# Patient Record
Sex: FEMALE | Employment: UNEMPLOYED | URBAN - METROPOLITAN AREA
[De-identification: names, ages, dates, MRNs, and addresses within clinical notes are randomized per-mention and may not be internally consistent; named-entity substitution may affect disease eponyms.]

---

## 2018-09-17 ENCOUNTER — TRANSFERRED RECORDS (OUTPATIENT)
Dept: HEALTH INFORMATION MANAGEMENT | Facility: CLINIC | Age: 38
End: 2018-09-17

## 2018-10-24 ENCOUNTER — OFFICE VISIT (OUTPATIENT)
Dept: OTOLARYNGOLOGY | Facility: CLINIC | Age: 38
End: 2018-10-24

## 2018-10-24 DIAGNOSIS — Z98.890 POSTOPERATIVE STATE: Primary | ICD-10-CM

## 2018-10-24 NOTE — LETTER
10/24/2018     RE: Michelle Hendrickson  [address] [place] [address]     Dear Colleague,    Thank you for referring your patient, Michelle Hendrickson, to the [facility]NT [facility] at [facility]. Please see a copy of my visit note below.    Service Date: 10/24/2018      Ms. Hendrickson is back today and greatly appreciates the improvement in the appearance of her nose.  She breathes better as well.  She has noticed some tip fullness, which was expected and discussed with her preoperatively as we expected.  I think things can be improved with some steroid injection; 0.15 mL of a mix of 10 and 40 mg/mL Kenalog was placed in the right supraalar area on the right and in the supertip area.  She will see us in about 8 weeks.         TYRA NORMAN MD       D: 10/24/2018   T: 10/25/2018   MT: eugene    Name:     MICHELLE HENDRICKSON   MRN:      [id number]        Account:      [id number]   :      1980           Service Date: 10/24/2018    Document: [id number]

## 2018-10-25 NOTE — PROGRESS NOTES
Service Date: 10/24/2018      Ms. Hendrickson is back today and greatly appreciates the improvement in the appearance of her nose.  She breathes better as well.  She has noticed some tip fullness, which was expected and discussed with her preoperatively as we expected.  I think things can be improved with some steroid injection; 0.15 mL of a mix of 10 and 40 mg/mL Kenalog was placed in the right supraalar area on the right and in the supertip area.  She will see us in about 8 weeks.         TYRA NORMAN MD             D: 10/24/2018   T: 10/25/2018   MT: eugene      Name:     MICHELLE HENDRICKSON   MRN:      [id number]        Account:      [id number]   :      1980           Service Date: 10/24/2018      Document: [id number]

## 2018-10-29 ENCOUNTER — OFFICE VISIT (OUTPATIENT)
Dept: OBGYN | Facility: CLINIC | Age: 38
End: 2018-10-29
Payer: MEDICAID

## 2018-10-29 DIAGNOSIS — N89.8 VAGINAL DISCHARGE: ICD-10-CM

## 2018-10-29 DIAGNOSIS — N89.8 VAGINAL ITCHING: Primary | ICD-10-CM

## 2018-10-29 DIAGNOSIS — N92.0 MENORRHAGIA WITH REGULAR CYCLE: ICD-10-CM

## 2018-10-29 LAB
SPECIMEN SOURCE: NORMAL
WET PREP SPEC: NORMAL

## 2018-10-29 PROCEDURE — 87210 SMEAR WET MOUNT SALINE/INK: CPT | Performed by: OBSTETRICS & GYNECOLOGY

## 2018-10-29 PROCEDURE — 99203 OFFICE O/P NEW LOW 30 MIN: CPT | Performed by: OBSTETRICS & GYNECOLOGY

## 2018-10-29 NOTE — PROGRESS NOTES
Maria Esther is a 38 year old  referred here by self for consultation regarding vaginal itching for 3 days. She tried OTC monistat that caused a lot of burning so she cleaned it off after 2 hours. She used the monistat because it was recommended 2 years ago with similler symptoms. She also did some shaving about a day prior to the itching..  She had hysteroscopy/polypectomy in 3/2018. Has PARAGARD IUD Placed. She is inquiring about possibly switching to Mirena IUD to better control her menses.  She is new to [place] and plans on transferring her care.  Obstetric History    T3  L3   SAB1 TAB0 Ectopic0 Multiple0 Live Births3     # Outcome Date GA Lbr Josh/2nd Weight Sex Delivery Anes PTL Lv   4 Term 16 40w3d 3.63 kg (8 lb) M Vag EPIDURAL BRENDA   Apgar1: 9 Apgar5: 9   3 Term 2012 41w0d VAGINAL RYAN BRENDA   2 SAB 2011   1 Term 2010 41w0d VAGINAL RYAN BRENDA     ROS: Ten point review of systems was reviewed and negative except the above.    Gyne: - abn pap (last pap ), - STD's  Past Surgical History:   Procedure Laterality Date     APPENDECTOMY  2014     AS HYSTEROSCOPY W ENDOMETRIAL BX/POLYPECTOMY W/WO D&C N/A 2018       ALL/Meds: Her medication and allergy histories were reviewed and are documented in their appropriate chart areas.    SH: - tob, - EtOH,     FH: Her family history was reviewed and documented in its appropriate chart area.    PE: There were no vitals taken for this visit.  There is no height or weight on file to calculate BMI.    General Appearance:  healthy, alert, active, no distress  HEENT: NCAT  Abdomen: Soft, nontender.  Normal bowel sounds.  No masses  Pelvic:       - Ext: NEFG,        - Urethra: no lesions, no masses, no hypermobility       - Urethral Meatus: normal appearance, no       - Bladder: no tenderness, no masses       - Vagina: pink, moist, normal rugae, no lesions, min discharge       - Cervix: no lesions, parous. IUD strings visible and appropriate.       -  Uterus: normal sized, AV/RV/Midplane, mobile,  contour       - Adnexa: no masses, no tenderness       - Rectal: deferred, normal tone, negative guaic       - Pelvic support: no cystocele, no rectocele, no uterine prolapse  Results for orders placed or performed in visit on 10/29/18   Wet prep   Result Value Ref Range    Specimen Description Vagina     Wet Prep No Trichomonas seen     Wet Prep No clue cells seen     Wet Prep No yeast seen        A/P    ICD-10-CM    1. Vaginal itching N89.8 Wet prep   2. Vaginal discharge N89.8 Wet prep   3. Menorrhagia with regular cycle N92.0        I reasuured her of normal wet prep. Discussed that shaving could have possibly caused the itching.  ACOG pamphlet was provided on vaginitis.    She was provided written information on the MIRENA IUD.  Answered questions about the MIRENA IUD as a good option to control heavy menses if the Paragard IUD is not satisfactory to her.    She will return to clinic for complete annual exam.    30 minutes was spent face to face with the patient today discussing her history, diagnosis, and follow-up plan as noted above.  Over 50% of the visit was spent in counseling and coordination of care.        CEPHAS AGBEH, MD.

## 2018-10-29 NOTE — MR AVS SNAPSHOT
After Visit Summary   10/29/2018    Maria Esther Hendrickson    MRN: [id number]           Patient Information     Date Of Birth          1980        Visit Information        Provider Department      10/29/2018 9:00 AM Agbeh, Cephas Mawuena, MD [facility]        Today's Diagnoses     Vaginal itching    -  1    Vaginal discharge        Menorrhagia with regular cycle          Care Instructions                                                        If you have any questions regarding your visit, Please contact your care team.    [facility] Access Services: [phone]      Women s Health CLINIC HOURS TELEPHONE NUMBER   Cephas Agbeh, M.D.    KHUSHI Wilson,     ZHANNA Yeung, RN             Monday-[place]    8:00a.m-4:45 p.m    Tuesday--[place]     8:00a.m-4:45 p.m.    Thursday-[place]    8:00a.m-4:45 p.m.    Friday-[place]    8:00a.m-4:45 p.m    [facility]   [address] [place] [address]   [phone]-[facility]   Fax [phone]   Fdjcjcn-[phone]     [facility] Delivery   [address] [place] [address] [place] [address]   [phone]     [facility]   [address] [facility] [address]   [phone]   Dpapqdz-[phone]    Urgent Care locations:    [facility]  Monday-Friday   5 pm - 9 pm   Saturday and Sunday    9 am - 5 pm      Monday-Friday    11 pm - 9 pm  Saturday and Sunday   9 am - 5 pm    [phone]     If you need a medication refill, please contact your pharmacy. Please allow 3 business days for your refill to be completed.  As always, Thank you for trusting us with your healthcare needs!            Follow-ups after your visit        Your next 10 appointments already scheduled     Nov 05, 2018  9:45 AM CST   Office Visit with Cephas Mawuena Agbeh, MD   [facility]    [address] [facility]  [id number] [phone]           Bring a current list of meds and any records pertaining to this visit. For Physicals, please bring immunization records and any forms needing to be filled out. Please arrive 10 minutes early to complete paperwork.              Who to contact     If you have questions or need follow up information about today's clinic visit or your schedule please contact [facility] directly at [phone].  Normal or non-critical lab and imaging results will be communicated to you by MyChart, letter or phone within 4 business days after the clinic has received the results. If you do not hear from us within 7 days, please contact the clinic through MyChart or phone. If you have a critical or abnormal lab result, we will notify you by phone as soon as possible.  Submit refill requests through LRN or call your pharmacy and they will forward the refill request to us. Please allow 3 business days for your refill to be completed.          Additional Information About Your Visit        Care EveryWhere ID     This is your Care EveryWhere ID. This could be used by other organizations to access your [place] medical records  [id number]         Blood Pressure from Last 3 Encounters:   No data found for BP    Weight from Last 3 Encounters:   No data found for Wt              We Performed the Following     Wet prep        Primary Care Provider Office Phone # Fax #    [facility] [phone] [address] [facility] [address]        Equal Access to Services     PATITO THOMPSON : Hadii chloe khan Soarian, waaxda luqadaha, qaybta kaalmada agata banuelos . So [facility] [phone].    ATENCIÓN: Si habla español, tiene a glover disposición servicios gratuitos de asistencia lingüística. Lopez al [phone].    We comply with applicable federal civil rights laws and [place] laws. We do not discriminate on the basis of race,  color, national origin, age, disability, sex, sexual orientation, or gender identity.            Thank you!     Thank you for choosing [facility]  for your care. Our goal is always to provide you with excellent care. Hearing back from our patients is one way we can continue to improve our services. Please take a few minutes to complete the written survey that you may receive in the mail after your visit with us. Thank you!             Your Updated Medication List - Protect others around you: Learn how to safely use, store and throw away your medicines at www.disposemymeds.org.      Notice  As of 10/29/2018 10:28 AM    You have not been prescribed any medications.

## 2018-10-29 NOTE — PATIENT INSTRUCTIONS
If you have any questions regarding your visit, Please contact your care team.    [facility] Access Services: [phone]      [facility] CLINIC HOURS TELEPHONE NUMBER   Cephas Agbeh, M.D.    KHUSHI Wilson,     ZHANNA Yeung, ZHANNA             Monday-[place]    8:00a.m-4:45 p.m    Tuesday--[place]     8:00a.m-4:45 p.m.    Thursday-[place]    8:00a.m-4:45 p.m.    Friday-[place]    8:00a.m-4:45 p.m    [facility]   [address]   [place] [address] [phone]-Ask for [facility]   Fax [phone]   Oxchsjb-[phone]     [facility] Labor and Delivery   [address] [place] [address] [address].   [place], [place] [address] [phone]     [facility]   [address] [facility] [address] [phone]   Mvyndqs-[phone]    Urgent Care locations:    [facility]  Monday-Friday   5 pm - 9 pm   Saturday and Sunday    9 am - 5 pm      Monday-Friday    11 pm - 9 pm  Saturday and Sunday   9 am - 5 pm    [phone]     If you need a medication refill, please contact your pharmacy. Please allow 3 business days for your refill to be completed.  As always, Thank you for trusting us with your healthcare needs!

## 2018-10-31 PROBLEM — J34.2 NASAL SEPTAL DEVIATION: Status: ACTIVE | Noted: 2018-10-31

## 2018-10-31 PROBLEM — J34.3 HYPERTROPHY OF INFERIOR NASAL TURBINATE: Status: ACTIVE | Noted: 2018-10-31

## 2018-10-31 PROBLEM — J34.89 NASAL OBSTRUCTION: Status: ACTIVE | Noted: 2018-10-31

## 2018-10-31 PROBLEM — N84.0 UTERINE POLYP: Status: ACTIVE | Noted: 2018-01-25

## 2018-10-31 PROBLEM — Z41.1 ENCOUNTER FOR COSMETIC PROCEDURE: Status: ACTIVE | Noted: 2018-10-31

## 2018-10-31 PROBLEM — J34.89 NASAL VALVE BLOCKAGE: Status: ACTIVE | Noted: 2018-10-31

## 2018-10-31 RX ORDER — DOCUSATE SODIUM 100 MG/1
CAPSULE, LIQUID FILLED ORAL
Refills: 0 | COMMUNITY
Start: 2018-09-12 | End: 2022-12-27

## 2018-10-31 RX ORDER — MELOXICAM 7.5 MG/1
TABLET ORAL
Refills: 2 | COMMUNITY
Start: 2018-09-08 | End: 2022-12-27

## 2018-10-31 RX ORDER — MUPIROCIN CALCIUM 20 MG/G
CREAM TOPICAL
Refills: 0 | COMMUNITY
Start: 2018-09-13 | End: 2022-12-27

## 2018-10-31 RX ORDER — CHLORHEXIDINE GLUCONATE ORAL RINSE 1.2 MG/ML
15 SOLUTION DENTAL
COMMUNITY
Start: 2018-04-23 | End: 2022-12-27

## 2018-10-31 RX ORDER — METHOCARBAMOL 500 MG/1
TABLET, FILM COATED ORAL
Refills: 2 | COMMUNITY
Start: 2018-09-08 | End: 2022-12-27

## 2018-11-08 ENCOUNTER — OFFICE VISIT (OUTPATIENT)
Dept: OBGYN | Facility: CLINIC | Age: 38
End: 2018-11-08
Payer: MEDICAID

## 2018-11-08 VITALS
SYSTOLIC BLOOD PRESSURE: 138 MMHG | BODY MASS INDEX: 25.42 KG/M2 | HEART RATE: 68 BPM | OXYGEN SATURATION: 98 % | DIASTOLIC BLOOD PRESSURE: 76 MMHG | RESPIRATION RATE: 17 BRPM | HEIGHT: 69 IN | WEIGHT: 171.6 LBS

## 2018-11-08 DIAGNOSIS — Z01.419 ENCOUNTER FOR GYNECOLOGICAL EXAMINATION WITHOUT ABNORMAL FINDING: Primary | ICD-10-CM

## 2018-11-08 LAB
ALBUMIN SERPL-MCNC: 4 G/DL (ref 3.4–5)
ALP SERPL-CCNC: 67 U/L (ref 40–150)
ALT SERPL W P-5'-P-CCNC: 17 U/L (ref 0–50)
ANION GAP SERPL CALCULATED.3IONS-SCNC: 8 MMOL/L (ref 3–14)
AST SERPL W P-5'-P-CCNC: 15 U/L (ref 0–45)
BILIRUB SERPL-MCNC: 0.2 MG/DL (ref 0.2–1.3)
BUN SERPL-MCNC: 20 MG/DL (ref 7–30)
CALCIUM SERPL-MCNC: 9.2 MG/DL (ref 8.5–10.1)
CHLORIDE SERPL-SCNC: 105 MMOL/L (ref 94–109)
CO2 SERPL-SCNC: 25 MMOL/L (ref 20–32)
CREAT SERPL-MCNC: 0.63 MG/DL (ref 0.52–1.04)
ERYTHROCYTE [DISTWIDTH] IN BLOOD BY AUTOMATED COUNT: 13.1 % (ref 10–15)
GFR SERPL CREATININE-BSD FRML MDRD: >90 ML/MIN/1.7M2
GLUCOSE SERPL-MCNC: 84 MG/DL (ref 70–99)
HCT VFR BLD AUTO: 40.4 % (ref 35–47)
HGB BLD-MCNC: 13 G/DL (ref 11.7–15.7)
MCH RBC QN AUTO: 28 PG (ref 26.5–33)
MCHC RBC AUTO-ENTMCNC: 32.2 G/DL (ref 31.5–36.5)
MCV RBC AUTO: 87 FL (ref 78–100)
PLATELET # BLD AUTO: 287 10E9/L (ref 150–450)
POTASSIUM SERPL-SCNC: 4.3 MMOL/L (ref 3.4–5.3)
PROT SERPL-MCNC: 7.8 G/DL (ref 6.8–8.8)
RBC # BLD AUTO: 4.64 10E12/L (ref 3.8–5.2)
SODIUM SERPL-SCNC: 138 MMOL/L (ref 133–144)
TSH SERPL DL<=0.005 MIU/L-ACNC: 0.98 MU/L (ref 0.4–4)
WBC # BLD AUTO: 7.7 10E9/L (ref 4–11)

## 2018-11-08 PROCEDURE — 80053 COMPREHEN METABOLIC PANEL: CPT | Performed by: OBSTETRICS & GYNECOLOGY

## 2018-11-08 PROCEDURE — 85027 COMPLETE CBC AUTOMATED: CPT | Performed by: OBSTETRICS & GYNECOLOGY

## 2018-11-08 PROCEDURE — 99396 PREV VISIT EST AGE 40-64: CPT | Performed by: OBSTETRICS & GYNECOLOGY

## 2018-11-08 PROCEDURE — 84443 ASSAY THYROID STIM HORMONE: CPT | Performed by: OBSTETRICS & GYNECOLOGY

## 2018-11-08 PROCEDURE — 36415 COLL VENOUS BLD VENIPUNCTURE: CPT | Performed by: OBSTETRICS & GYNECOLOGY

## 2018-11-08 NOTE — PATIENT INSTRUCTIONS
If you have any questions regarding your visit, Please contact your care team.    [facility] Access Services: [phone]      [facility]   Cephas Agbeh, M.D.    KHUSHI Wilson,     ZHANNA Yeung, ZHANNA             Monday-[place]    8:00a.m-4:45 p.m    Tuesday--[place]     8:00a.m-4:45 p.m.    Thursday-[place]    8:00a.m-4:45 p.m.    Friday-[place]    8:00a.m-4:45 p.m    [facility]   [address]   [place] [address] [phone]-Ask for [facility]   Fax [phone]   Zytdibb-[phone]     [facility] Labor and Delivery   [address] [place] [address] Dr.   [place], [place] [address] [phone]     [facility]   [address] [address] [phone]   Dvxpljo-[phone]    Urgent Care locations:    [facility]  Monday-Friday   5 pm - 9 pm   Saturday and Sunday    9 am - 5 pm      Monday-Friday    11 pm - 9 pm  Saturday and Sunday   9 am - 5 pm    [phone]     If you need a medication refill, please contact your pharmacy. Please allow 3 business days for your refill to be completed.  As always, Thank you for trusting us with your healthcare needs!

## 2018-11-08 NOTE — MR AVS SNAPSHOT
After Visit Summary   11/8/2018    Maria Esther Hendrickson    MRN: [id number]           Patient Information     Date Of Birth          1980        Visit Information        Provider Department      11/8/2018 3:30 PM Agbeh, Cephas Mawuena, MD [facility]        Today's Diagnoses     Encounter for gynecological examination without abnormal finding    -  1      Care Instructions                                                        If you have any questions regarding your visit, Please contact your care team.    [facility] Access Services: [phone]      [facility] CLINIC HOURS TELEPHONE NUMBER   Cephas Agbeh, M.D.    KHUSHI Wilson,     ZHANNA Yeung, RN             Monday-[place]    8:00a.m-4:45 p.m    Tuesday--[place]     8:00a.m-4:45 p.m.    Thursday-[place]    8:00a.m-4:45 p.m.    Friday-[place]    8:00a.m-4:45 p.m    [facility]   [address] [place] [address]   [phone]-Ask for [facility]   Fax [phone]   Jmepjsj-[phone]     [facility] Labor and Delivery   [address] [place] [address]   [phone]     [facility]   [address] [address] [phone]   Todmlau-[phone]    Urgent Care locations:    [facility]  Monday-Friday   5 pm - 9 pm   Saturday and Sunday    9 am - 5 pm      Monday-Friday    11 pm - 9 pm  Saturday and Sunday   9 am - 5 pm    [phone]     If you need a medication refill, please contact your pharmacy. Please allow 3 business days for your refill to be completed.  As always, Thank you for trusting us with your healthcare needs!            Follow-ups after your visit        Future tests that were ordered for you today     Open Future Orders        Priority Expected Expires Ordered    *MA Screening Digital Bilateral Routine  11/8/2019 11/8/2018            Who to contact     If you have questions or  "need follow up information about today's clinic visit or your schedule please contact [facility] directly at [phone].  Normal or non-critical lab and imaging results will be communicated to you by MyChart, letter or phone within 4 business days after the clinic has received the results. If you do not hear from us within 7 days, please contact the clinic through MyChart or phone. If you have a critical or abnormal lab result, we will notify you by phone as soon as possible.  Submit refill requests through [employer] or call your pharmacy and they will forward the refill request to us. Please allow 3 business days for your refill to be completed.          Additional Information About Your Visit        Care EveryWhere ID     This is your Care EveryWhere ID. This could be used by other organizations to access your [place] medical records  [id number]        Your Vitals Were     Pulse Respirations Height Last Period Pulse Oximetry Breastfeeding?    68 17 5' 9.15\" (1.756 m) 11/04/2018 (Exact Date) 98% No    BMI (Body Mass Index)                   25.23 kg/m2            Blood Pressure from Last 3 Encounters:   11/08/18 138/76    Weight from Last 3 Encounters:   11/08/18 171 lb 9.6 oz (77.8 kg)              We Performed the Following     CBC with platelets     Comprehensive metabolic panel     TSH with free T4 reflex        Primary Care Provider Office Phone # Fax #    [facility] [phone] [address] [facility] [address]        Equal Access to Services     PATITO THOMPSON : Hadii aad ku hadasho Soelioali, waaxda luqadaha, qaybta kaalmada adeegyada, agata monroe . So [facility] [phone].    ATENCIÓN: Si habla español, tiene a glover disposición servicios gratuitos de asistencia lingüística. Llame al [phone].    We comply with applicable federal civil rights laws and [place] laws. We do not discriminate on the basis of race, color, national " origin, age, disability, sex, sexual orientation, or gender identity.            Thank you!     Thank you for choosing [facility]  for your care. Our goal is always to provide you with excellent care. Hearing back from our patients is one way we can continue to improve our services. Please take a few minutes to complete the written survey that you may receive in the mail after your visit with us. Thank you!             Your Updated Medication List - Protect others around you: Learn how to safely use, store and throw away your medicines at www.disposemymeds.org.          This list is accurate as of 11/8/18  3:55 PM.  Always use your most recent med list.                   Brand Name Dispense Instructions for use Diagnosis    chlorhexidine 0.12 % solution    PERIDEX     15 mLs         MG capsule   Generic drug:  docusate sodium      TK ONE C PO BID PRN TO LOOSEN STOOL.        MAPAP 500 MG tablet   Generic drug:  acetaminophen           meloxicam 7.5 MG tablet    MOBIC     TK 1 T PO BID        methocarbamol 500 MG tablet    ROBAXIN     TK 1 T PO Q 6 HOURS PRN        mupirocin 2 % cream    BACTROBAN     APPLY TO NASAL VESTIBULE TID STARTING 3 DAYS BEFORE SURGERY.

## 2018-11-08 NOTE — PROGRESS NOTES
"Maria Esther is a 38 year old  here for annual exam.   Requesting Paragard IUD strings to be shortened. Spouse feels them.  She is undecided about switching to the Mirena IUD to improve her menses.    ROS: Ten point review of systems was reviewed and negative except the above.    Health Maintenance   Topic Date Due     PHQ-2 Q1 YR  1990     HIV SCREEN (SYSTEM ASSIGNED)  1996     INFLUENZA VACCINE (1) 2018     PAP SCREENING Q3 YR (SYSTEM ASSIGNED)  2021     TETANUS IMMUNIZATION (SYSTEM ASSIGNED)  2025      Last pap:   Last Mammogram: none  Last Dexa: none  Last Colonoscopy: none  No results found for: CHOL  No results found for: HDL  No results found for: LDL  No results found for: TRIG  No results found for: CHOLHDLRATIO      OBHX:    Obstetric History       T3      L3     SAB1   TAB0   Ectopic0   Multiple0   Live Births0       # Outcome Date GA Lbr Josh/2nd Weight Sex Delivery Anes PTL Lv   4 Term 16    M Vag-Spont      3 Term 12    M Vag-Spont      2 Term 09/05/10    M Vag-Spont      1 SAB                 Past Surgical History:   Procedure Laterality Date     APPENDECTOMY  2014     AS HYSTEROSCOPY W ENDOMETRIAL BX/POLYPECTOMY W/WO D&C N/A 2018     PMH: Her past medical, surgical, and obstetric histories were reviewed and are documented in their appropriate chart areas.    ALL/Meds: Her medication and allergy histories were reviewed and are documented in their appropriate chart areas.    SH/FMH: Her social and family history was reviewed and documented in its appropriate chart area.    PE: /76 (BP Location: Left arm, Cuff Size: Adult Regular)  Pulse 68  Resp 17  Ht 5' 9.15\" (1.756 m)  Wt 171 lb 9.6 oz (77.8 kg)  LMP 2018 (Exact Date)  SpO2 98%  Breastfeeding? No  BMI 25.23 kg/m2  Body mass index is 25.23 kg/(m^2).    General Appearance:  healthy, alert, active, no distress  Cardiovascular:  Regular rate and Rhythm  Neck: Supple, no " adenopathy and thyroid normal  Lungs:  Clear, without wheeze, rale or rhonchi  Breast: normal breast exam  Abdomen: Benign, Soft, flat, non-tender, No masses, organomegaly, No inguinal nodes and Bowel sounds normoactiveSoft, nontender.   Pelvic:       - Ext: Vulva and perineum are normal without lesion, mass or discharge        - Urethra: normal without discharge or scarring no hypermobility       - Urethral Meatus: normal appearance, no       - Bladder: no tenderness, no masses       - Vagina: Normal mucosa, scant blood     rugated       - Cervix: multiparous. IUD strings visible and elongated and was trimmed.       - Uterus:Normal shape, position and consistencyfirm, nontender, nongravid uterus without CMT       - Adnexa: Normal without masses or tenderness       - Rectal: deferred    A/P:  Well Woman,     ICD-10-CM    1. Encounter for gynecological examination without abnormal finding Z01.419 CBC with platelets     Comprehensive metabolic panel     TSH with free T4 reflex     *MA Screening Digital Bilateral      -  BC: IUD      - Encouraged self-breast exam   - Encouraged low fat diet, regular exercise, and adequate calcium intake.    CEPHAS AGBEH, MD.

## 2018-11-26 ENCOUNTER — RADIANT APPOINTMENT (OUTPATIENT)
Dept: MAMMOGRAPHY | Facility: CLINIC | Age: 38
End: 2018-11-26
Attending: OBSTETRICS & GYNECOLOGY
Payer: MEDICAID

## 2018-11-26 DIAGNOSIS — Z01.419 ENCOUNTER FOR GYNECOLOGICAL EXAMINATION WITHOUT ABNORMAL FINDING: ICD-10-CM

## 2018-11-26 DIAGNOSIS — Z12.31 VISIT FOR SCREENING MAMMOGRAM: ICD-10-CM

## 2018-11-26 PROCEDURE — 77067 SCR MAMMO BI INCL CAD: CPT | Mod: TC

## 2018-12-03 ENCOUNTER — OFFICE VISIT (OUTPATIENT)
Dept: OTOLARYNGOLOGY | Facility: CLINIC | Age: 38
End: 2018-12-03

## 2018-12-03 DIAGNOSIS — Z98.890 POSTOPERATIVE STATE: Primary | ICD-10-CM

## 2018-12-03 NOTE — LETTER
Date:January 17, 2019      Patient was self referred, no letter generated. Do not send.        [facility] Health Information

## 2018-12-03 NOTE — LETTER
12/3/2018       RE: Michelle Hendrickson  [address] [place] [address]     Dear Colleague,    Thank you for referring your patient, Michelle Hendrickson, to the [facility] ENT [facility]ON[facility] at [facility]. Please see a copy of my visit note below.    This office note has been dictated.     Service Date: 2018      Ms. Hendrickson is in to see us a bit earlier than scheduled.  She is heading on Friday for a 3 week stint in [place].  The profile is markedly improved.  She does still have some fullness in the alar and supraalar areas bilaterally, a bit more so on the right than the left.  I injected 0.12 of a mix of 10 and 40 mg/mL Kenalog into the 2 sides with slightly more on the right than the left.  Xylocaine was mixed with it.  She will see us again in February after she returns.  Her airway is good.         TYRA NORMAN MD             D: 2018   T: 2018   MT: eugene      Name:     MICHELLE HENDRICKSON   MRN:      [id number]        Account:      [id number]   :      1980           Service Date: 2018      Document: [id number]       Again, thank you for allowing me to participate in the care of your patient.      Sincerely,    TYRA NORMAN MD

## 2018-12-04 NOTE — PROGRESS NOTES
Service Date: 2018      Ms. Hendrickson is in to see us a bit earlier than scheduled.  She is heading on Friday for a 3 week stint in [place].  The profile is markedly improved.  She does still have some fullness in the alar and supraalar areas bilaterally, a bit more so on the right than the left.  I injected 0.12 of a mix of 10 and 40 mg/mL Kenalog into the 2 sides with slightly more on the right than the left.  Xylocaine was mixed with it.  She will see us again in February after she returns.  Her airway is good.         TYRA NORMAN MD             D: 2018   T: 2018   MT: eugene      Name:     MICHELLE HENDRICKSON   MRN:      [id number]        Account:      [id number]   :      1980           Service Date: 2018      Document: [id number]

## 2018-12-05 NOTE — NURSING NOTE
Chief Complaint   Patient presents with     Post-op Visit     Obtained photo documentation.  Patient to follow up with Dr. Nicole in February 2019.  Bebe Anderson, Patient Coordinator

## 2019-01-11 ENCOUNTER — OFFICE VISIT (OUTPATIENT)
Dept: OBGYN | Facility: CLINIC | Age: 39
End: 2019-01-11
Payer: COMMERCIAL

## 2019-01-11 VITALS
HEART RATE: 83 BPM | SYSTOLIC BLOOD PRESSURE: 123 MMHG | WEIGHT: 174.6 LBS | BODY MASS INDEX: 25.67 KG/M2 | OXYGEN SATURATION: 98 % | DIASTOLIC BLOOD PRESSURE: 75 MMHG

## 2019-01-11 DIAGNOSIS — Z30.432 ENCOUNTER FOR IUD REMOVAL: Primary | ICD-10-CM

## 2019-01-11 DIAGNOSIS — N92.1 METRORRHAGIA: ICD-10-CM

## 2019-01-11 PROCEDURE — 58301 REMOVE INTRAUTERINE DEVICE: CPT | Performed by: OBSTETRICS & GYNECOLOGY

## 2019-01-11 RX ORDER — COPPER 313.4 MG/1
1 INTRAUTERINE DEVICE INTRAUTERINE ONCE
COMMUNITY
End: 2022-12-27

## 2019-01-11 NOTE — PROGRESS NOTES
SUBJECTIVE:     Maria Esther Hendrickson is a 38 year old requests removal of an ParaGard IUD.   She has continued to have irreguler bleeding, for almost 20 days.  She had Hysteroscopy,D&C and polypectomy,with reinsertion of this IUD in 3/2018.  There was a slight improvement in her menses till now. She had contemplated replacing the Paragard with the MIRENA IUD.  At this time she wants the Paragard IUD out to see if her menses will imporve.  GYN Ultrasound    Exam performed on:  1/8/2018  Referring provider: Hema Lyons  Referring clinic: [facility]  Clinical indications: Intermenstrual spotting, right sided pain  LMP:  12/17/17  Sonographer(s) initials: ss    The pelvic organs are imaged using: both transvaginal and transabdominal   transducers to better visualize the pelvic anatomy.  Today's ultrasound was compared to previous report from: 8/26/16    Measurements and comments  Uterus:  Longitudinal AP Transverse   7.9 4.9 6.1 cm   Position:  retroverted  Comments:  symmetrical    Cervix and lower uterine segment are: 0.9 x 0.7cm area of mixed   echogenicity with doppler flow within cervical canal.  Myometrium: homogeneous      Saline infusion sonohysterogram: no  Endometrium: anterior 4.2 mm, posterior 4.4mm, total endometrium 8.6mm IUD   is seen in appropriate position in endometrial cavity, 0.7cm from fundal   end.    Right ovary:   Longitudinal AP Transverse   2.8 2.1 3.0 cm   Comments:Small amount of free fluid posterior ovary.    Left ovary:  Longitudinal AP Transverse   4.3 2.0 2.7 cm   Comments:0.4 x 0.1 x 0.2cm echogenic focus, 2.3cm hypoechoic area with   surrounding doppler, c/w corpus luteum,  small to moderate amount of free   fluid posterior ovary and cul-de-sac     Adnexa:  no masses seen    Peritoneal fluid: present, small to moderate amount posterior cul-de-sac   and right and left ovaries    Impression:  Uterus appears normal.  There is a 0.9 x 0.7 cm area of mixed   echogenicity with blood  flow within the cervical canal suggestive of a   cervical polyp.  This is near the IUD string and may be debris associated   with the IUD although the blood flow is more suggestive of polyp.    Clinical correlation recommended.  Endometrial echo 8.6 mm with IUD properly situated within the cavity.  Right ovary appears WNL.  Left ovary appears WNL with corpus luteum present and small echogenic area   c/w small calcification.  Moderate free fluid in the culdesac.    Plan: These findings were reviewed with the patient. She will follow up   with her referring provider.     Additional Comments: Patient will make follow up appointment with Dr Lucia.    Aubree Santiago MD   1/8/2018     Past Surgical History:   Procedure Laterality Date     APPENDECTOMY  06/2014     AS HYSTEROSCOPY W ENDOMETRIAL BX/POLYPECTOMY W/WO D&C N/A 03/2018     OBJECTIVE    External genitalia: normal without lesions.  Vagina: normal mucosa and rugae, without  discharge.  Cervix: normal without lesion.    String is noted at the os, ringed forceps used to remove IUD.        ASSESSMENT/PLAN    ICD-10-CM    1. Encounter for IUD removal Z30.432 REMOVE INTRAUTERINE DEVICE   2. Metrorrhagia N92.1         -Maria Esther tolerated procedure without immediate complication.     -Follow up with unexplained fever, abdominal or pelvic pain.      -Plans to use natural family planning for contraception   Consider a repeat SIS or hysteroscopy D&C or  other surgical management..   ACOG pamphlet was provided on abnormal uterine bleeding.   All questions were answered.    CEPHAS AGBEH, MD.

## 2019-05-15 ENCOUNTER — OFFICE VISIT (OUTPATIENT)
Dept: PLASTIC SURGERY | Facility: CLINIC | Age: 39
End: 2019-05-15

## 2019-05-15 DIAGNOSIS — Z98.890 POSTOPERATIVE STATE: Primary | ICD-10-CM

## 2019-05-15 NOTE — LETTER
May 15, 2019  Re: Maria Esther Hendrickson  1980    Dear Dr. Lam,    Thank you so much for referring Maria Esther Hendrickson to the [facility]. I had the pleasure of visiting with Maria Esther today.     Attached you will find a copy of my note. Please feel free to reach out to me with any questions, [phone].     This office note has been dictated.       Your trust in our practice and care is much appreciated.    Sincerely,  TYRA NORMAN MD

## 2019-05-15 NOTE — LETTER
Date:June 18, 2019      Patient was self referred, no letter generated. Do not send.        [facility] Information

## 2019-05-15 NOTE — NURSING NOTE
Chief Complaint   Patient presents with     Post-op Visit     Tip Rhinoplasty     Obtained photo documentation.

## 2019-05-16 NOTE — PROGRESS NOTES
Service Date: 05/15/2019      Ms. Hendrickson is back today.  She breathes well.  Her nose lower third continues to improve in its shape.  There is still a bit of fullness in the right supra alar area.  We injected a small additional amount of mixed 40 and 10 mg/mL Kenalog into this area.        She will see us again in a couple of months.  If the area does not resolve adequately with the passage of time, I would consider an in-office cartilage splitting incision to access the area and carry out a conservative trim of the lateral ramiro.  She will be in contact with us.         TYRA NORMAN MD             D: 2019   T: 2019   MT: ms      Name:     MICHELLE HENDRICKSON   MRN:      [id number]        Account:      [id number]   :      1980           Service Date: 05/15/2019      Document: [id number]

## 2021-06-23 ENCOUNTER — OFFICE VISIT (OUTPATIENT)
Dept: PLASTIC SURGERY | Facility: CLINIC | Age: 41
End: 2021-06-23

## 2021-06-23 DIAGNOSIS — Z98.890 POSTOPERATIVE STATE: Primary | ICD-10-CM

## 2021-06-23 NOTE — LETTER
June 23, 2021  Re: Maria Esther Hendrickson  1980    Dear Dr. Lloyd ref. provider found,    Thank you so much for referring Maria Esther Hendrickson to the [facility]. I had the pleasure of visiting with Maria Esther today.     Attached you will find a copy of my note. Please feel free to reach out to me with any questions, [phone].     This office note has been dictated.         Your trust in our practice and care is much appreciated.    Sincerely,  TYRA NORMAN MD

## 2021-06-23 NOTE — LETTER
6/23/2021       RE: Maria Esther Hendrickson  [address]  [place] [address]     Dear Colleague,    Thank you for referring your patient, Maria Esther Hendrickson, to the [facility]. Please see a copy of my visit note below.    This office note has been dictated.       Again, thank you for allowing me to participate in the care of your patient.      Sincerely,    TYRA NORMAN MD

## 2021-06-27 NOTE — PROGRESS NOTES
Service Date: 2021    REASON FOR VISIT: Ms. Hendrickson is in today.      PHYSICAL EXAMINATION:  She still has some supratip fullness.  It is some better.      PROCEDURE: I injected an additional 0.1 cc of mixed 10 and 40 mg/cc Kenalog.      ASSESSMENT AND PLAN: I will see her in a couple months.      Moise Nicole MD        D: 2021   T: 2021   MT: ms    Name:     MICHELLE HENDRICKSON  MRN:      [id number]        Account:      [id number]   :      1980           Service Date: 2021       Document: [id number]

## 2022-11-29 ENCOUNTER — TRANSFERRED RECORDS (OUTPATIENT)
Dept: HEALTH INFORMATION MANAGEMENT | Facility: CLINIC | Age: 42
End: 2022-11-29

## 2022-12-06 ENCOUNTER — OFFICE VISIT (OUTPATIENT)
Dept: CARDIOLOGY | Facility: CLINIC | Age: 42
End: 2022-12-06
Payer: COMMERCIAL

## 2022-12-06 VITALS
BODY MASS INDEX: 26.47 KG/M2 | HEART RATE: 73 BPM | OXYGEN SATURATION: 98 % | WEIGHT: 180 LBS | SYSTOLIC BLOOD PRESSURE: 110 MMHG | DIASTOLIC BLOOD PRESSURE: 70 MMHG | RESPIRATION RATE: 16 BRPM

## 2022-12-06 DIAGNOSIS — U09.9 LONG COVID: ICD-10-CM

## 2022-12-06 DIAGNOSIS — G90.A POTS (POSTURAL ORTHOSTATIC TACHYCARDIA SYNDROME): Primary | ICD-10-CM

## 2022-12-06 PROCEDURE — 99204 OFFICE O/P NEW MOD 45 MIN: CPT | Performed by: INTERNAL MEDICINE

## 2022-12-06 RX ORDER — MELOXICAM 15 MG/1
TABLET ORAL
COMMUNITY
Start: 2019-05-29

## 2022-12-06 RX ORDER — IBUPROFEN 800 MG/1
800 TABLET, FILM COATED ORAL
COMMUNITY
Start: 2022-11-17

## 2022-12-06 RX ORDER — PREDNISONE 10 MG/1
10 TABLET ORAL
COMMUNITY
Start: 2022-11-29 | End: 2022-12-27

## 2022-12-06 RX ORDER — METOPROLOL TARTRATE 25 MG/1
25 TABLET, FILM COATED ORAL 3 TIMES DAILY
COMMUNITY
Start: 2022-11-07 | End: 2022-12-27

## 2022-12-06 RX ORDER — ACETAMINOPHEN 500 MG
500 TABLET ORAL
COMMUNITY

## 2022-12-06 RX ORDER — IVABRADINE 5 MG/1
5 TABLET, FILM COATED ORAL 2 TIMES DAILY WITH MEALS
Qty: 60 TABLET | Refills: 11 | Status: SHIPPED | OUTPATIENT
Start: 2022-12-06 | End: 2022-12-27

## 2022-12-06 NOTE — LETTER
12/6/2022    [facility] Kelly Pretty  [address]  [place] [address]    RE: Maria Esther Hendrickson       Dear Colleague,     I had the pleasure of seeing Maria Esther Hendrickson in the [facility].    [facility]   [address], [place] [address]   [email or web address]   OFFICE: [phone]     CARDIOLOGY CLINIC NOTE     Thank you, Dr. Kelly Pretty, [facility], for asking the [facility] team to see Ms. Maria Esther Hendrickson to evaluate New Patient (palpitations)        Assessment/Recommendations   Assessment:    1. Autonomic dysfunction, most consistent with POTS and likely related to COVID 19 infection in late August.   2. Very small pericardial effusion - not likely contributing to symptoms.    Plan:  1. Recommended she finish her prednisone taper, but before continuing additional treatment for possible pericarditis would confirm an active inflammatory state with an elevated ESR or CRP.   2. Wean off of metoprolol  3. Start ivabradine 5 mg BID for autonomic dysfunction.  4. Referral placed to the long-covid clinic  5. Discussed avoiding caffeine, pushing fluids, and eating salty snacks.  6. Follow up in 6 weeks         History of Present Illness   Ms. Maria Esther Hendrickson is a 42 year old female who presents for evaluation of palpitations.    Ms. Hendrickson contracted COVID-19 in middle of August of this year.  She initially had mild symptoms but a few weeks later developed fever with myalgias and a cough which lasted an additional 2 weeks.  After the secondary set of symptoms she developed significant palpitations with a resting heart rate sometimes up to 100 2030 beats a minute and climbing precipitously with minimal activity.  She was hospitalized at [facility] because of her significant tachycardia.  Evaluation with a cardiac MRI did not reveal any significant gadolinium enhancement with normal LV and RV size and  function.  No valve disease identified.  She was started on metoprolol which she has continued however she does not feel well while taking this medication.  She admits it does improve her heart rate but generally she has continued fatigue and other side effects from the metoprolol.  She is also been treated with 2 months of colchicine and was recently started on a prednisone taper for what sounds like pericarditis, however her ESR and CRP have not been elevated since August 30.  She continues to have significant palpitations with minimal exertion and her heart rate by her apple watch will easily climb to 120- 130 bpm with walking however her resting heart rate has improved over the past few months.  She continues to feel significant lightheadedness and dizziness.  Her symptoms significantly impair her ability to function in her day-to-day life.      Other than noted above, Ms. Hendrickson denies any chest pain/pressure/tightness, shortness of breath at rest or with exertion, light headedness/dizziness, pre-syncope, syncope, lower extremity swelling, palpitations, paroxysmal nocturnal dyspnea (PND), or orthopnea.     Cardiac Problems and Cardiac Diagnostics     Most Recent Cardiac testing:    ECHO (report reviewed):   Limited TTE 11/28/22  Final Conclusion Previous Study: 11-    Visually Estimated EF: 55-60%    Normal global left ventricular size, wall thickness, and ejection function with no obvious   regional wall motion/thickening abnormalities.    Normal left atrial size.    Mild aortic regurgitation.    Very small pericardial effusion seen.    Dilated IVC size, >50% collapse with respiration.     TTE 11/15/22  Final Conclusion Previous Study: 3-    Visually Estimated EF: 55-60%    Normal left ventricular size, wall thickness, and global systolic function with no obvious   regional wall motion abnormalities.    Normal right ventricular size and function.    No significant valvular heart disease noted.     Small pericardial effusion.    Dilated IVC size, <50% collapse with respiration.    No prior studies available for comparison       Cardiac MRI 22  Final conclusions:   1. The gadolinium delay enhancement shows no scar/fibrosis/inflammation in   the ventricular myocardium.   2. The left ventricle size is normal.  The LV wall thickness is normal.     There is no LV wall motion abnormality. The LV systolic function is   normal.  Calculated LVEF is 57%.     3. The right ventricle is normal in size and wall motion. RV systolic   function is preserved (calclated RVEF 53%).   4. No significant valve dysfunction noted.   5. Pericardium is within normal limits (no significant delayed enhancement   or effusion); no evidence of constriction on real time imaging.   6. Non cardiac finding reported separately below, per Radiology.      Cardiac cath: from    Calcium Score: 0   Left Main: 0   LAD: 0   LCX:  0   RCA: 0     Calcium Score Percentile: 0   This means that the 0% of asymptomatic people of the same age, gender, and   race will have a lower calcium score.          Medications  Allergies   Current Outpatient Medications   Medication Sig Dispense Refill     acetaminophen (TYLENOL) 500 MG tablet Take 500 mg by mouth       aspirin (ASA) 81 MG EC tablet Take 81 mg by mouth       ibuprofen (ADVIL/MOTRIN) 800 MG tablet Take 800 mg by mouth       ivabradine (CORLANOR) 5 MG tablet Take 1 tablet (5 mg) by mouth 2 times daily (with meals) 60 tablet 11     meloxicam (MOBIC) 15 MG tablet SMARTSI.5 Tablet(s) By Mouth Twice Daily       methocarbamol (ROBAXIN) 500 MG tablet TK 1 T PO Q 6 HOURS PRN  2     metoprolol tartrate (LOPRESSOR) 25 MG tablet Take 25 mg by mouth 3 times daily       predniSONE (DELTASONE) 10 MG tablet Take 10 mg by mouth       chlorhexidine (PERIDEX) 0.12 % solution 15 mLs (Patient not taking: Reported on 2022)        MG capsule TK ONE C PO BID PRN TO LOOSEN STOOL. (Patient  not taking: Reported on 12/6/2022)  0     MAPAP 500 MG tablet  (Patient not taking: Reported on 12/6/2022)  0     meloxicam (MOBIC) 7.5 MG tablet TK 1 T PO BID (Patient not taking: Reported on 12/6/2022)  2     mupirocin (BACTROBAN) 2 % cream APPLY TO NASAL VESTIBULE TID STARTING 3 DAYS BEFORE SURGERY. (Patient not taking: Reported on 12/6/2022)  0     paragard intrauterine copper device 1 each by Intrauterine route once (Patient not taking: Reported on 12/6/2022)        Allergies   Allergen Reactions     Contrast Dye      No Clinical Screening - See Comments Other (See Comments)     LW Other1: -seasonal     Diatrizoate Itching     Medicated with 50mg IV Benadryl on 2- and pt sneezed three times after injection but said it was much better then last time.  Jessica Pepper ....................  2/27/2015   4:02 PM        Physical Examination Review of Systems   Vitals: /70 (BP Location: Left arm, Patient Position: Sitting, Cuff Size: Adult Regular)   Pulse 73   Resp 16   Wt 81.6 kg (180 lb)   SpO2 98%   BMI 26.47 kg/m    BMI= Body mass index is 26.47 kg/m .  Wt Readings from Last 3 Encounters:   12/06/22 81.6 kg (180 lb)   01/11/19 79.2 kg (174 lb 9.6 oz)   11/08/18 77.8 kg (171 lb 9.6 oz)       General Appearance:   Pleasant female, appears stated age. no acute distress, overweight body habitus   ENT/Mouth: membranes moist, no apparent gingival bleeding.      EYES:  no scleral icterus, normal conjunctivae   Neck: no carotid bruits. supple   Respiratory:   lungs are clear to auscultation, no rales or wheezing, equal chest wall expansion    Cardiovascular:   Regular rhythm, normal rate. Normal first and second heart sounds with \no murmurs, rubs, or gallops; the carotid, radial and posterior tibial pulses are intact, Jugular venous pressure normal, no edema bilaterally    Abdomen/GI:  Soft, non-tender   Extremities: no cyanosis or clubbing   Skin: no xanthelasma, warm.    Heme/lymph/ Immunology No  apparent bleeding noted.   Neurologic: Alert and oriented. normal gait, no tremors   Psychiatric: Pleasant, calm, appropriate affect.         Please refer above for cardiac ROS details.       Past History   Past Medical History: History reviewed. No pertinent past medical history.     Past Surgical History:   Past Surgical History:   Procedure Laterality Date     APPENDECTOMY  06/2014     AS HYSTEROSCOPY W ENDOMETRIAL BX/POLYPECTOMY W/WO D&C N/A 03/2018        Family History: History reviewed. No pertinent family history.     Social History:   Social History     Socioeconomic History     Marital status:      Spouse name: Not on file     Number of children: Not on file     Years of education: Not on file     Highest education level: Not on file   Occupational History     Not on file   Tobacco Use     Smoking status: Never     Smokeless tobacco: Never   Substance and Sexual Activity     Alcohol use: No     Drug use: No     Sexual activity: Yes     Partners: Male     Birth control/protection: I.U.D.   Other Topics Concern     Not on file   Social History Narrative     Not on file     Social Determinants of Health     Financial Resource Strain: Not on file   Food Insecurity: Not on file   Transportation Needs: Not on file   Physical Activity: Not on file   Stress: Not on file   Social Connections: Not on file   Intimate Partner Violence: Not on file   Housing Stability: Not on file            Lab Results    Chemistry/lipid CBC Cardiac Enzymes/BNP/TSH/INR   Lab Results   Component Value Date    BUN 20 11/08/2018     11/08/2018    CO2 25 11/08/2018    Lab Results   Component Value Date    WBC 7.7 11/08/2018    HGB 13.0 11/08/2018    HCT 40.4 11/08/2018    MCV 87 11/08/2018     11/08/2018    Lab Results   Component Value Date    TSH 0.98 11/08/2018                Thank you for allowing me to participate in the care of your patient.      Sincerely,     Angel Rocha MD     [facility]  [facility]  cc: Referred Self,

## 2022-12-06 NOTE — PROGRESS NOTES
[facility]   [address], [place] [address]   [email or web address]   OFFICE: [phone]     CARDIOLOGY CLINIC NOTE     Thank you, Dr. Kelly Pretty, [facility], for asking the [facility] team to see Ms. Maria Esther Hendrickson to evaluate New Patient (palpitations)        Assessment/Recommendations   Assessment:    1. Autonomic dysfunction, most consistent with POTS and likely related to COVID 19 infection in late August.   2. Very small pericardial effusion - not likely contributing to symptoms.    Plan:  1. Recommended she finish her prednisone taper, but before continuing additional treatment for possible pericarditis would confirm an active inflammatory state with an elevated ESR or CRP.   2. Wean off of metoprolol  3. Start ivabradine 5 mg BID for autonomic dysfunction.  4. Referral placed to the long-covid clinic  5. Discussed avoiding caffeine, pushing fluids, and eating salty snacks.  6. Follow up in 6 weeks         History of Present Illness   Ms. Maria Esther Hendrickson is a 42 year old female who presents for evaluation of palpitations.    Ms. Hendrickson contracted COVID-19 in middle of August of this year.  She initially had mild symptoms but a few weeks later developed fever with myalgias and a cough which lasted an additional 2 weeks.  After the secondary set of symptoms she developed significant palpitations with a resting heart rate sometimes up to 100 2030 beats a minute and climbing precipitously with minimal activity.  She was hospitalized at [facility] because of her significant tachycardia.  Evaluation with a cardiac MRI did not reveal any significant gadolinium enhancement with normal LV and RV size and function.  No valve disease identified.  She was started on metoprolol which she has continued however she does not feel well while taking this medication.  She admits it does improve her heart rate but generally she has  continued fatigue and other side effects from the metoprolol.  She is also been treated with 2 months of colchicine and was recently started on a prednisone taper for what sounds like pericarditis, however her ESR and CRP have not been elevated since August 30.  She continues to have significant palpitations with minimal exertion and her heart rate by her apple watch will easily climb to 120- 130 bpm with walking however her resting heart rate has improved over the past few months.  She continues to feel significant lightheadedness and dizziness.  Her symptoms significantly impair her ability to function in her day-to-day life.      Other than noted above, Ms. Hendrickson denies any chest pain/pressure/tightness, shortness of breath at rest or with exertion, light headedness/dizziness, pre-syncope, syncope, lower extremity swelling, palpitations, paroxysmal nocturnal dyspnea (PND), or orthopnea.     Cardiac Problems and Cardiac Diagnostics     Most Recent Cardiac testing:    ECHO (report reviewed):   Limited TTE 11/28/22  Final Conclusion Previous Study: 11-    Visually Estimated EF: 55-60%    Normal global left ventricular size, wall thickness, and ejection function with no obvious   regional wall motion/thickening abnormalities.    Normal left atrial size.    Mild aortic regurgitation.    Very small pericardial effusion seen.    Dilated IVC size, >50% collapse with respiration.     TTE 11/15/22  Final Conclusion Previous Study: 3-    Visually Estimated EF: 55-60%    Normal left ventricular size, wall thickness, and global systolic function with no obvious   regional wall motion abnormalities.    Normal right ventricular size and function.    No significant valvular heart disease noted.    Small pericardial effusion.    Dilated IVC size, <50% collapse with respiration.    No prior studies available for comparison       Cardiac MRI 9/13/22  Final conclusions:   1. The gadolinium delay enhancement shows no  scar/fibrosis/inflammation in   the ventricular myocardium.   2. The left ventricle size is normal.  The LV wall thickness is normal.     There is no LV wall motion abnormality. The LV systolic function is   normal.  Calculated LVEF is 57%.     3. The right ventricle is normal in size and wall motion. RV systolic   function is preserved (calclated RVEF 53%).   4. No significant valve dysfunction noted.   5. Pericardium is within normal limits (no significant delayed enhancement   or effusion); no evidence of constriction on real time imaging.   6. Non cardiac finding reported separately below, per Radiology.      Cardiac cath: from    Calcium Score: 0   Left Main: 0   LAD: 0   LCX:  0   RCA: 0     Calcium Score Percentile: 0   This means that the 0% of asymptomatic people of the same age, gender, and   race will have a lower calcium score.          Medications  Allergies   Current Outpatient Medications   Medication Sig Dispense Refill     acetaminophen (TYLENOL) 500 MG tablet Take 500 mg by mouth       aspirin (ASA) 81 MG EC tablet Take 81 mg by mouth       ibuprofen (ADVIL/MOTRIN) 800 MG tablet Take 800 mg by mouth       ivabradine (CORLANOR) 5 MG tablet Take 1 tablet (5 mg) by mouth 2 times daily (with meals) 60 tablet 11     meloxicam (MOBIC) 15 MG tablet SMARTSI.5 Tablet(s) By Mouth Twice Daily       methocarbamol (ROBAXIN) 500 MG tablet TK 1 T PO Q 6 HOURS PRN  2     metoprolol tartrate (LOPRESSOR) 25 MG tablet Take 25 mg by mouth 3 times daily       predniSONE (DELTASONE) 10 MG tablet Take 10 mg by mouth       chlorhexidine (PERIDEX) 0.12 % solution 15 mLs (Patient not taking: Reported on 2022)        MG capsule TK ONE C PO BID PRN TO LOOSEN STOOL. (Patient not taking: Reported on 2022)  0     MAPAP 500 MG tablet  (Patient not taking: Reported on 2022)  0     meloxicam (MOBIC) 7.5 MG tablet TK 1 T PO BID (Patient not taking: Reported on 2022)  2     mupirocin  (BACTROBAN) 2 % cream APPLY TO NASAL VESTIBULE TID STARTING 3 DAYS BEFORE SURGERY. (Patient not taking: Reported on 12/6/2022)  0     paragard intrauterine copper device 1 each by Intrauterine route once (Patient not taking: Reported on 12/6/2022)        Allergies   Allergen Reactions     Contrast Dye      No Clinical Screening - See Comments Other (See Comments)     LW Other1: -seasonal     Diatrizoate Itching     Medicated with 50mg IV Benadryl on 2- and pt sneezed three times after injection but said it was much better then last time.  Jessica Pepper ....................  2/27/2015   4:02 PM        Physical Examination Review of Systems   Vitals: /70 (BP Location: Left arm, Patient Position: Sitting, Cuff Size: Adult Regular)   Pulse 73   Resp 16   Wt 81.6 kg (180 lb)   SpO2 98%   BMI 26.47 kg/m    BMI= Body mass index is 26.47 kg/m .  Wt Readings from Last 3 Encounters:   12/06/22 81.6 kg (180 lb)   01/11/19 79.2 kg (174 lb 9.6 oz)   11/08/18 77.8 kg (171 lb 9.6 oz)       General Appearance:   Pleasant female, appears stated age. no acute distress, overweight body habitus   ENT/Mouth: membranes moist, no apparent gingival bleeding.      EYES:  no scleral icterus, normal conjunctivae   Neck: no carotid bruits. supple   Respiratory:   lungs are clear to auscultation, no rales or wheezing, equal chest wall expansion    Cardiovascular:   Regular rhythm, normal rate. Normal first and second heart sounds with \no murmurs, rubs, or gallops; the carotid, radial and posterior tibial pulses are intact, Jugular venous pressure normal, no edema bilaterally    Abdomen/GI:  Soft, non-tender   Extremities: no cyanosis or clubbing   Skin: no xanthelasma, warm.    Heme/lymph/ Immunology No apparent bleeding noted.   Neurologic: Alert and oriented. normal gait, no tremors   Psychiatric: Pleasant, calm, appropriate affect.         Please refer above for cardiac ROS details.       Past History   Past Medical  History: History reviewed. No pertinent past medical history.     Past Surgical History:   Past Surgical History:   Procedure Laterality Date     APPENDECTOMY  06/2014     AS HYSTEROSCOPY W ENDOMETRIAL BX/POLYPECTOMY W/WO D&C N/A 03/2018        Family History: History reviewed. No pertinent family history.     Social History:   Social History     Socioeconomic History     Marital status:      Spouse name: Not on file     Number of children: Not on file     Years of education: Not on file     Highest education level: Not on file   Occupational History     Not on file   Tobacco Use     Smoking status: Never     Smokeless tobacco: Never   Substance and Sexual Activity     Alcohol use: No     Drug use: No     Sexual activity: Yes     Partners: Male     Birth control/protection: I.U.D.   Other Topics Concern     Not on file   Social History Narrative     Not on file     Social Determinants of Health     Financial Resource Strain: Not on file   Food Insecurity: Not on file   Transportation Needs: Not on file   Physical Activity: Not on file   Stress: Not on file   Social Connections: Not on file   Intimate Partner Violence: Not on file   Housing Stability: Not on file            Lab Results    Chemistry/lipid CBC Cardiac Enzymes/BNP/TSH/INR   Lab Results   Component Value Date    BUN 20 11/08/2018     11/08/2018    CO2 25 11/08/2018    Lab Results   Component Value Date    WBC 7.7 11/08/2018    HGB 13.0 11/08/2018    HCT 40.4 11/08/2018    MCV 87 11/08/2018     11/08/2018    Lab Results   Component Value Date    TSH 0.98 11/08/2018

## 2022-12-06 NOTE — PATIENT INSTRUCTIONS
It was a pleasure to meet with you today.      Below is a summary of your visit.   Start taking ivabradine 5 mg twice daily with meals.  Gradually wean off of metoprolol. For two weeks, take 12.5 mg twice daily,then once daily for an additional 2 weeks, then stop, but if you feel worse when you stop, you can take it every other day for a couple weeks before stopping.   Finish the prednisone taper as prescribed by Dr. Field  Follow up with me in about 6 weeks       Please do not hesitate to call the [facility] with any questions or concerns at [phone].     Sincerely,

## 2022-12-27 ENCOUNTER — TELEPHONE (OUTPATIENT)
Dept: PHYSICAL MEDICINE AND REHAB | Facility: CLINIC | Age: 42
End: 2022-12-27

## 2022-12-27 ENCOUNTER — VIRTUAL VISIT (OUTPATIENT)
Dept: PHYSICAL MEDICINE AND REHAB | Facility: CLINIC | Age: 42
End: 2022-12-27
Attending: INTERNAL MEDICINE
Payer: COMMERCIAL

## 2022-12-27 DIAGNOSIS — R00.0 RACING HEART BEAT: Primary | ICD-10-CM

## 2022-12-27 DIAGNOSIS — Z74.09 POST-COVID CHRONIC DECREASED MOBILITY AND ENDURANCE: ICD-10-CM

## 2022-12-27 DIAGNOSIS — F41.9 POST-COVID CHRONIC ANXIETY: ICD-10-CM

## 2022-12-27 DIAGNOSIS — U09.9 LONG COVID: ICD-10-CM

## 2022-12-27 DIAGNOSIS — R42 DIZZINESS: ICD-10-CM

## 2022-12-27 DIAGNOSIS — U09.9 POST-COVID CHRONIC ANXIETY: ICD-10-CM

## 2022-12-27 DIAGNOSIS — G90.A POTS (POSTURAL ORTHOSTATIC TACHYCARDIA SYNDROME): ICD-10-CM

## 2022-12-27 DIAGNOSIS — U09.9 POST-COVID CHRONIC DECREASED MOBILITY AND ENDURANCE: ICD-10-CM

## 2022-12-27 PROCEDURE — 99205 OFFICE O/P NEW HI 60 MIN: CPT | Mod: GT | Performed by: PHYSICAL MEDICINE & REHABILITATION

## 2022-12-27 RX ORDER — PROPRANOLOL HCL 60 MG
60 CAPSULE, EXTENDED RELEASE 24HR ORAL
COMMUNITY
Start: 2022-12-23 | End: 2023-10-23 | Stop reason: SINTOL

## 2022-12-27 ASSESSMENT — ENCOUNTER SYMPTOMS
SLEEP DISTURBANCES DUE TO BREATHING: 1
EYE PAIN: 0
ALTERED TEMPERATURE REGULATION: 0
BOWEL INCONTINENCE: 0
WHEEZING: 0
JAUNDICE: 0
DIARRHEA: 0
POLYDIPSIA: 0
ORTHOPNEA: 1
POSTURAL DYSPNEA: 1
SPUTUM PRODUCTION: 0
COUGH DISTURBING SLEEP: 0
LEG PAIN: 0
HALLUCINATIONS: 0
SNORES LOUDLY: 0
HYPERTENSION: 0
COUGH: 1
CHILLS: 0
SYNCOPE: 0
EYE IRRITATION: 0
ABDOMINAL PAIN: 1
VOMITING: 0
DOUBLE VISION: 1
DECREASED APPETITE: 0
CONSTIPATION: 1
FEVER: 1
HEARTBURN: 0
BLOATING: 0
WEIGHT GAIN: 0
RECTAL PAIN: 0
EXERCISE INTOLERANCE: 0
WEIGHT LOSS: 0
HEMOPTYSIS: 0
LIGHT-HEADEDNESS: 1
POLYPHAGIA: 0
FATIGUE: 1
SHORTNESS OF BREATH: 1
INCREASED ENERGY: 1
EYE WATERING: 0
NAUSEA: 0
EYE REDNESS: 0
DYSPNEA ON EXERTION: 1
HYPOTENSION: 0
BLOOD IN STOOL: 0
NIGHT SWEATS: 0
PALPITATIONS: 0

## 2022-12-27 ASSESSMENT — ANXIETY QUESTIONNAIRES
1. FEELING NERVOUS, ANXIOUS, OR ON EDGE: SEVERAL DAYS
2. NOT BEING ABLE TO STOP OR CONTROL WORRYING: SEVERAL DAYS
7. FEELING AFRAID AS IF SOMETHING AWFUL MIGHT HAPPEN: SEVERAL DAYS
IF YOU CHECKED OFF ANY PROBLEMS ON THIS QUESTIONNAIRE, HOW DIFFICULT HAVE THESE PROBLEMS MADE IT FOR YOU TO DO YOUR WORK, TAKE CARE OF THINGS AT HOME, OR GET ALONG WITH OTHER PEOPLE: SOMEWHAT DIFFICULT
7. FEELING AFRAID AS IF SOMETHING AWFUL MIGHT HAPPEN: SEVERAL DAYS
GAD7 TOTAL SCORE: 7
6. BECOMING EASILY ANNOYED OR IRRITABLE: SEVERAL DAYS
8. IF YOU CHECKED OFF ANY PROBLEMS, HOW DIFFICULT HAVE THESE MADE IT FOR YOU TO DO YOUR WORK, TAKE CARE OF THINGS AT HOME, OR GET ALONG WITH OTHER PEOPLE?: SOMEWHAT DIFFICULT
3. WORRYING TOO MUCH ABOUT DIFFERENT THINGS: SEVERAL DAYS
GAD7 TOTAL SCORE: 7
4. TROUBLE RELAXING: SEVERAL DAYS
5. BEING SO RESTLESS THAT IT IS HARD TO SIT STILL: SEVERAL DAYS
GAD7 TOTAL SCORE: 7

## 2022-12-27 ASSESSMENT — PATIENT HEALTH QUESTIONNAIRE - PHQ9
10. IF YOU CHECKED OFF ANY PROBLEMS, HOW DIFFICULT HAVE THESE PROBLEMS MADE IT FOR YOU TO DO YOUR WORK, TAKE CARE OF THINGS AT HOME, OR GET ALONG WITH OTHER PEOPLE: SOMEWHAT DIFFICULT
SUM OF ALL RESPONSES TO PHQ QUESTIONS 1-9: 6
SUM OF ALL RESPONSES TO PHQ QUESTIONS 1-9: 6

## 2022-12-27 NOTE — LETTER
12/27/2022       RE: Maria Esther Hendrickson  [address] [place] [address]     Dear Colleague,    Thank you for referring your patient, Maria Esther Hendrickson, to the [facility] PHYSICAL [facility]IC[facility]IN[facility] [place] at [facility]. Please see a copy of my visit note below.    Answers for HPI/ROS submitted by the patient on 12/27/2022  If you checked off any problems, how difficult have these problems made it for you to do your work, take care of things at home, or get along with other people?: Somewhat difficult  PHQ9 TOTAL SCORE: 6  JESSICA 7 TOTAL SCORE: 7  General Symptoms: Yes  Skin Symptoms: No  HENT Symptoms: No  EYE SYMPTOMS: Yes  HEART SYMPTOMS: Yes  LUNG SYMPTOMS: Yes  INTESTINAL SYMPTOMS: Yes  URINARY SYMPTOMS: No  GYNECOLOGIC SYMPTOMS: No  BREAST SYMPTOMS: No  SKELETAL SYMPTOMS: No  BLOOD SYMPTOMS: No  NERVOUS SYSTEM SYMPTOMS: No  MENTAL HEALTH SYMPTOMS: No  Fever: Yes  Loss of appetite: No  Weight loss: No  Weight gain: No  Fatigue: Yes  Night sweats: No  Chills: No  Increased stress: No  Excessive hunger: No  Excessive thirst: No  Feeling hot or cold when others believe the temperature is normal: No  Loss of height: No  Post-operative complications: No  Surgical site pain: No  Hallucinations: No  Change in or Loss of Energy: Yes  Hyperactivity: No  Confusion: No  Eye pain: No  Vision loss: No  Dry eyes: No  Watery eyes: No  Eye bulging: No  Double vision: Yes  Flashing of lights: No  Spots: No  Floaters: No  Redness: No  Crossed eyes: No  Tunnel Vision: No  Yellowing of eyes: No  Eye irritation: No  Chest pain or pressure: No  Fast or irregular heartbeat: No  Pain in legs with walking: No  Trouble breathing while lying down: Yes  Fingers or toes appear blue: No  High blood pressure: No  Low blood pressure: No  Fainting: No  Murmurs: No  Pacemaker: No  Varicose veins: No  Edema or swelling: No  Wake up at night with shortness of breath:  Yes  Light-headedness: Yes  Exercise intolerance: No  Cough: Yes  Sputum or phlegm: No  Coughing up blood: No  Difficulty breating or shortness of breath: Yes  Snoring: No  Wheezing: No  Difficulty breathing on exertion: Yes  Nighttime Cough: No  Difficulty breathing when lying flat: Yes  Heart burn or indigestion: No  Nausea: No  Vomiting: No  Abdominal pain: Yes  Bloating: No  Constipation: Yes  Diarrhea: No  Blood in stool: No  Black stools: No  Rectal or Anal pain: No  Fecal incontinence: No  Yellowing of skin or eyes: No  Vomit with blood: No  Change in stools: No    History of COVID-19 infection: Yes  Date of first symptoms: 8/16/22  Diagnosis: antigen  Hospitalization: Yes- [facility]  Treatment: steroids, Conservative, over the counter  Current Symptoms:  See subjective  Goals of Care: decrease heart racing and BP spikes, decrease anxiety, decrease depression, improve quality of life and return to work    Current concerns: No    956}  PHQ Assesment Total Score(s) 12/27/2022   PHQ-9 Score 6   Some recent data might be hidden     JESSICA-7 Results 12/27/2022   JESSICA 7 TOTAL SCORE 7 (mild anxiety)   Some recent data might be hidden     PTSD Screen Score 12/27/2022   Have you ever experienced this kind of event? No   Some recent data might be hidden     12/27/22    Scoring Physical Function (higher score better) (range: 4 - 20) 6 (   Sum of 4 Physical Function Questions)   Scoring Anxiety (lower score better) (range: 4 - 20) 11 (   Sum of 4 Anxiety Questions)   Scoring Depression (lower score better) (range: 4 - 20) 10 (   Sum of 4 Depression Questions)   Scoring Fatigue (lower score better) (range: 4 - 20) 17 (   Sum of 4 Fatigue [place] )   Scoring Sleep Disturbance (lower score better) (range: 4 - 20) 14 (   Sum of 4 Sleep Disturbance Questions)   Scoring Satisfaction with Social Role (higher score better) (range: 4 - 20) 17 (   Sum of 4 Satisfaction with Social Role Questions)   Scoring Pain Interference (lower score  better) (range: 4 - 20) 16 (   Sum of 4 Pain Interference Questions)         Past Medical History:    Past Medical History:   Diagnosis Date     POTS (postural orthostatic tachycardia syndrome)         Surgical History:   Past Surgical History:   Procedure Laterality Date     APPENDECTOMY  06/2014     AS HYSTEROSCOPY W ENDOMETRIAL BX/POLYPECTOMY W/WO D&C N/A 03/2018        Medications:    Current Outpatient Medications   Medication     acetaminophen (TYLENOL) 500 MG tablet     aspirin (ASA) 81 MG EC tablet     ibuprofen (ADVIL/MOTRIN) 800 MG tablet     meloxicam (MOBIC) 15 MG tablet     propranolol ER (INDERAL LA) 60 MG 24 hr capsule     No current facility-administered medications for this visit.        Allergies:    Allergies   Allergen Reactions     Contrast Dye      No Clinical Screening - See Comments Other (See Comments)     LW Other1: -seasonal     Diatrizoate Itching     Medicated with 50mg IV Benadryl on 2- and pt sneezed three times after injection but said it was much better then last time.  Jessica Pepper ....................  2/27/2015   4:02 PM        Family history: No family history on file.     Social History:   Social History     Tobacco Use     Smoking status: Never     Smokeless tobacco: Never   Substance Use Topics     Alcohol use: No     Drug use: No          Review of Symptoms:  Constitutional, HEENT, cardiovascular, pulmonary, GI, , musculoskeletal, neuro, skin, endocrine and psych systems are negative, except as otherwise noted.      SUBJECTIVE:     This 42 year old female presents to the [facility] Post-COVID clinic as a new consult to evaluate continuing symptoms after COVID infection initially diagnosed 8/16/22.  She experienced body aches, fever to 104 with chills.  She did better after about a week.  She then got recurrent symptoms similar as before but now with cough. She was treated OTC and steroids which helped with the cough. She was  "doing well until 22 and was working as an .   She then started to feel her heart racing very high when just sitting.  Her hands and face got numb and she felt faint.  She called 911.  She was short of breath.  She was taken to ED.  She went to [facility].  Her labs were normal.  Her BP was high and her heart rate was high.  She was short of breath and was admitted.  She was started on Metoprolol. She was discharged on the .  She followed with Cardiology at [facility].  Cardiac MRI was reviewed. It was felt she had pericarditis. Colchicine was started with some benefit.   The Metoprolol continued to give her shortness of breath and \"not feeling good\".  She was seen again by Cardiology and diagnosed with pericardial effusion. She was then was seen by [facility] and was told she may have POTS.   She eventually saw Dr. Rocha on 22 who also felt she had POTS.  She was instructed to start ivabrabine (which she is not taking), wean Metoprolol, avoid caffeine and f/u 6 weeks.  Continuing symptoms include palpitations, dizziness, heart racing, distractibility, decreased/abnormal taste and anxiety.   She feels her life has changed greatly and she is very very nervous her heart rate will increase and she will get short of breath.  She gets nervous.  She was recently changed to Propranolol which seems to be helping. When her heart starts to race her BP goes to 170 over 100.  She has felt most comfortable with Dr. Sepulveda ([facility]) and is scheduled to see him 23.  She is to see Dr. Marina Suazo cardiologist on 23.  Past medical history is significant for some panic attacks.  The patient had the J&J vaccine, then the Pfizer booster. She was previously working full time and has now cut down to about 12 hours a week. Maria Esther Hendrickson feels she is functioning at 50 % of pre COVID level.  Her father  of cancer last year followed by her mother passing from COVID.    OBJECTIVE:     Vitals:  No " vitals were obtained today due to virtual visit. Patient states no recent fevers.    Physical Exam   GENERAL: Healthy, alert and no distress  EYES: Eyes grossly normal to inspection.  No discharge or erythema, or obvious scleral/conjunctival abnormalities.  RESP: No audible wheeze, cough, or visible cyanosis.  No visible retractions or increased work of breathing.    SKIN: Visible skin clear. No significant rash, abnormal pigmentation or lesions.  NEURO: Cranial nerves grossly intact.  Mentation and speech appropriate for age.  PSYCH: Mentation appears normal, affect normal/bright, judgement and insight intact, normal speech and appearance well-groomed.      Labs:      I personally reviewed the following lab results today and those on care everywhere, if indicated             No results found for: CRP   No results found for: SED   Lab Results   Component Value Date    WBC 7.7 11/08/2018     Lab Results   Component Value Date    RBC 4.64 11/08/2018     Lab Results   Component Value Date    HGB 13.0 11/08/2018     Lab Results   Component Value Date    HCT 40.4 11/08/2018     No components found for: MCT  Lab Results   Component Value Date    MCV 87 11/08/2018     Lab Results   Component Value Date    MCH 28.0 11/08/2018     Lab Results   Component Value Date    MCHC 32.2 11/08/2018     Lab Results   Component Value Date    RDW 13.1 11/08/2018     Lab Results   Component Value Date     11/08/2018      No results found for: A1C   TSH   Date Value Ref Range Status   11/08/2018 0.98 0.40 - 4.00 mU/L Final      No results found for: VITDT   No results for input(s): MAG in the last 71828 hours.  Last Comprehensive Metabolic Panel:  Sodium   Date Value Ref Range Status   11/08/2018 138 133 - 144 mmol/L Final     Potassium   Date Value Ref Range Status   11/08/2018 4.3 3.4 - 5.3 mmol/L Final     Chloride   Date Value Ref Range Status   11/08/2018 105 94 - 109 mmol/L Final     Carbon Dioxide   Date Value Ref Range  Status   11/08/2018 25 20 - 32 mmol/L Final     Anion Gap   Date Value Ref Range Status   11/08/2018 8 3 - 14 mmol/L Final     Albumin   Date Value Ref Range Status   11/08/2018 4.0 3.4 - 5.0 g/dL Final     Glucose   Date Value Ref Range Status   11/08/2018 84 70 - 99 mg/dL Final     Comment:     Non Fasting     Urea Nitrogen   Date Value Ref Range Status   11/08/2018 20 7 - 30 mg/dL Final     Creatinine   Date Value Ref Range Status   11/08/2018 0.63 0.52 - 1.04 mg/dL Final     GFR Estimate   Date Value Ref Range Status   11/08/2018 >90 >60 mL/min/1.7m2 Final     Comment:     Non  GFR Calc     Calcium   Date Value Ref Range Status   11/08/2018 9.2 8.5 - 10.1 mg/dL Final     Bilirubin Total   Date Value Ref Range Status   11/08/2018 0.2 0.2 - 1.3 mg/dL Final     Alkaline Phosphatase   Date Value Ref Range Status   11/08/2018 67 40 - 150 U/L Final     ALT   Date Value Ref Range Status   11/08/2018 17 0 - 50 U/L Final     AST   Date Value Ref Range Status   11/08/2018 15 0 - 45 U/L Final      Ref Range & Units 3 mo ago   CORTISOL,TOTAL ug/dL 17.4    Resulting Agency  [facility]   Narrative  Performed by [facility]  Cortisol 8:00  AM - 12:00 PM     (5.0-25.0)   Cortisol 12:00 PM - 8:00  PM     (5.0-15.0)   Cortisol 8:00  PM - 8:00  AM     (< 10.0)  Specimen Collected: 09/19/22  1:14 AM Last Resulted: 09/19/22  6:36 PM   Received From: [facility]  Result Received: 12/02/22 11:13 AM         Reviewed labs from [facility] sites and [facility]    I personally reviewed the following imaging results today and those on care everywhere, if indicated     ECHO (report reviewed):   Limited TTE 11/28/22  Final Conclusion Previous Study: 11-    Visually Estimated EF: 55-60%    Normal global left ventricular size, wall thickness, and ejection function with no obvious   regional wall  motion/thickening abnormalities.    Normal left atrial size.    Mild aortic regurgitation.    Very small pericardial effusion seen.    Dilated IVC size, >50% collapse with respiration.      TTE 11/15/22  Final Conclusion Previous Study: 3-    Visually Estimated EF: 55-60%    Normal left ventricular size, wall thickness, and global systolic function with no obvious   regional wall motion abnormalities.    Normal right ventricular size and function.    No significant valvular heart disease noted.    Small pericardial effusion.    Dilated IVC size, <50% collapse with respiration.    No prior studies available for comparison        Cardiac MRI 9/13/22  Final conclusions:   1. The gadolinium delay enhancement shows no scar/fibrosis/inflammation in   the ventricular myocardium.   2. The left ventricle size is normal.  The LV wall thickness is normal.     There is no LV wall motion abnormality. The LV systolic function is   normal.  Calculated LVEF is 57%.     3. The right ventricle is normal in size and wall motion. RV systolic   function is preserved (calclated RVEF 53%).   4. No significant valve dysfunction noted.   5. Pericardium is within normal limits (no significant delayed enhancement   or effusion); no evidence of constriction on real time imaging.   6. Non cardiac finding reported separately below, per Radiology.       Cardiac cath: from 9/13/ demonstrated   Calcium Score: 0   Left Main: 0   LAD: 0   LCX:  0   RCA: 0     Calcium Score Percentile: 0   This means that the 0% of asymptomatic people of the same age, gender, and   race will have a lower calcium score.      Reviewed imaging from [facility] sites and [facility]     Medical Records Reviewed:    Reviewed consults/documents from [facility] and [facility] including ED and Cardiology     Impression:  1. Heart racing  2. Dizziness  3. Post acute sequelae of COVID (Long COVID syndrome)  4. Anxiety  5. Depression?      Plan:  1. I reviewed  present knowledge on long-Covid.  Education was provided and questions were answered.  2. PT/OT for post COVID and to work in how to function with symptoms.  3. To see Dr. Sepulveda and Dr. Gray.  4. Referral to Health Psychology.  5. Continue to measure BP when heart racing. Keep diary.    6. I will follow up with Maria Esther Hendrickson in 3 months. I will review progress and consider need for any other therapeutic interventions. If there are any questions and/or concerns she will call the clinic.      On day of encounter time spent in chart review and with patient in consultation, exam, education and coordination of care, excluding procedures:   [id number]     Irma Escalante MD, FAAPMR   Department Rehabilitation Medicine-Wound Fellowship Director  Adjunct  Department Family Medicine and Community Health  [facility]

## 2022-12-27 NOTE — TELEPHONE ENCOUNTER
Left a voicemail to Bessie's phone stating that I was calling to schedule her 3 month follow up appt with Dr. Escalante.     David Romano VF

## 2022-12-27 NOTE — PROGRESS NOTES
Maria Esther is a 42 year old who is being evaluated via a billable video visit.      How would you like to obtain your AVS? MyChart  If the video visit is dropped, the invitation should be resent by: Text to cell phone: [phone]  Will anyone else be joining your video visit? No    Video-Visit Details    Type of service:  Video Visit   Video Start Time: 2:01 PM  Video End Time: 3:08 PM    Originating Location (pt. Location): Home  Distant Location (provider location):  Off-site  Platform used for Video Visit: Giferent     Answers for HPI/ROS submitted by the patient on 12/27/2022  If you checked off any problems, how difficult have these problems made it for you to do your work, take care of things at home, or get along with other people?: Somewhat difficult  PHQ9 TOTAL SCORE: 6  JESSICA 7 TOTAL SCORE: 7  General Symptoms: Yes  Skin Symptoms: No  HENT Symptoms: No  EYE SYMPTOMS: Yes  HEART SYMPTOMS: Yes  LUNG SYMPTOMS: Yes  INTESTINAL SYMPTOMS: Yes  URINARY SYMPTOMS: No  GYNECOLOGIC SYMPTOMS: No  BREAST SYMPTOMS: No  SKELETAL SYMPTOMS: No  BLOOD SYMPTOMS: No  NERVOUS SYSTEM SYMPTOMS: No  MENTAL HEALTH SYMPTOMS: No  Fever: Yes  Loss of appetite: No  Weight loss: No  Weight gain: No  Fatigue: Yes  Night sweats: No  Chills: No  Increased stress: No  Excessive hunger: No  Excessive thirst: No  Feeling hot or cold when others believe the temperature is normal: No  Loss of height: No  Post-operative complications: No  Surgical site pain: No  Hallucinations: No  Change in or Loss of Energy: Yes  Hyperactivity: No  Confusion: No  Eye pain: No  Vision loss: No  Dry eyes: No  Watery eyes: No  Eye bulging: No  Double vision: Yes  Flashing of lights: No  Spots: No  Floaters: No  Redness: No  Crossed eyes: No  Tunnel Vision: No  Yellowing of eyes: No  Eye irritation: No  Chest pain or pressure: No  Fast or irregular heartbeat: No  Pain in legs with walking: No  Trouble breathing while lying down: Yes  Fingers or toes appear blue:  No  High blood pressure: No  Low blood pressure: No  Fainting: No  Murmurs: No  Pacemaker: No  Varicose veins: No  Edema or swelling: No  Wake up at night with shortness of breath: Yes  Light-headedness: Yes  Exercise intolerance: No  Cough: Yes  Sputum or phlegm: No  Coughing up blood: No  Difficulty breating or shortness of breath: Yes  Snoring: No  Wheezing: No  Difficulty breathing on exertion: Yes  Nighttime Cough: No  Difficulty breathing when lying flat: Yes  Heart burn or indigestion: No  Nausea: No  Vomiting: No  Abdominal pain: Yes  Bloating: No  Constipation: Yes  Diarrhea: No  Blood in stool: No  Black stools: No  Rectal or Anal pain: No  Fecal incontinence: No  Yellowing of skin or eyes: No  Vomit with blood: No  Change in stools: No    History of COVID-19 infection: Yes  Date of first symptoms: 8/16/22  Diagnosis: antigen  Hospitalization: Yes- [facility]  Treatment: steroids, Conservative, over the counter  Current Symptoms:  See subjective  Goals of Care: decrease heart racing and BP spikes, decrease anxiety, decrease depression, improve quality of life and return to work    Current concerns: No    956}  PHQ Assesment Total Score(s) 12/27/2022   PHQ-9 Score 6   Some recent data might be hidden     JESSICA-7 Results 12/27/2022   JESSICA 7 TOTAL SCORE 7 (mild anxiety)   Some recent data might be hidden     PTSD Screen Score 12/27/2022   Have you ever experienced this kind of event? No   Some recent data might be hidden     12/27/22    Scoring Physical Function (higher score better) (range: 4 - 20) 6 (   Sum of 4 Physical Function Questions)   Scoring Anxiety (lower score better) (range: 4 - 20) 11 (   Sum of 4 Anxiety Questions)   Scoring Depression (lower score better) (range: 4 - 20) 10 (   Sum of 4 Depression Questions)   Scoring Fatigue (lower score better) (range: 4 - 20) 17 (   Sum of 4 Fatigue [facility] )   Scoring Sleep Disturbance (lower score better) (range: 4 - 20) 14 (   Sum of 4 Sleep Disturbance  Questions)   Scoring Satisfaction with Social Role (higher score better) (range: 4 - 20) 17 (   Sum of 4 Satisfaction with Social Role Questions)   Scoring Pain Interference (lower score better) (range: 4 - 20) 16 (   Sum of 4 Pain Interference Questions)         Past Medical History:    Past Medical History:   Diagnosis Date     POTS (postural orthostatic tachycardia syndrome)         Surgical History:   Past Surgical History:   Procedure Laterality Date     APPENDECTOMY  06/2014     AS HYSTEROSCOPY W ENDOMETRIAL BX/POLYPECTOMY W/WO D&C N/A 03/2018        Medications:    Current Outpatient Medications   Medication     acetaminophen (TYLENOL) 500 MG tablet     aspirin (ASA) 81 MG EC tablet     ibuprofen (ADVIL/MOTRIN) 800 MG tablet     meloxicam (MOBIC) 15 MG tablet     propranolol ER (INDERAL LA) 60 MG 24 hr capsule     No current facility-administered medications for this visit.        Allergies:    Allergies   Allergen Reactions     Contrast Dye      No Clinical Screening - See Comments Other (See Comments)     LW Other1: -seasonal     Diatrizoate Itching     Medicated with 50mg IV Benadryl on 2- and pt sneezed three times after injection but said it was much better then last time.  Jessica Pepper ....................  2/27/2015   4:02 PM        Family history: No family history on file.     Social History:   Social History     Tobacco Use     Smoking status: Never     Smokeless tobacco: Never   Substance Use Topics     Alcohol use: No     Drug use: No          Review of Symptoms:  Constitutional, HEENT, cardiovascular, pulmonary, GI, , musculoskeletal, neuro, skin, endocrine and psych systems are negative, except as otherwise noted.      SUBJECTIVE:     This 42 year old female presents to the [facility] Rehabilitation Medicine Post-COVID clinic as a new consult to evaluate continuing symptoms after COVID infection initially diagnosed 8/16/22.  She experienced body aches, fever to 104 with  "chills.  She did better after about a week.  She then got recurrent symptoms similar as before but now with cough. She was treated OTC and steroids which helped with the cough. She was doing well until 8/12/22 and was working as an .   She then started to feel her heart racing very high when just sitting.  Her hands and face got numb and she felt faint.  She called 911.  She was short of breath.  She was taken to ED.  She went to [facility].  Her labs were normal.  Her BP was high and her heart rate was high.  She was short of breath and was admitted.  She was started on Metoprolol. She was discharged on the 14th.  She followed with Cardiology at [facility], [place].  Cardiac MRI was reviewed. It was felt she had pericarditis. Colchicine was started with some benefit.   The Metoprolol continued to give her shortness of breath and \"not feeling good\".  She was seen again by Cardiology and diagnosed with pericardial effusion. She was then was seen by [facility] and was told she may have POTS.   She eventually saw Dr. Rocha on 12/6/22 who also felt she had POTS.  She was instructed to start ivabrabine (which she is not taking), wean Metoprolol, avoid caffeine and f/u 6 weeks.  Continuing symptoms include palpitations, dizziness, heart racing, distractibility, decreased/abnormal taste and anxiety.   She feels her life has changed greatly and she is very very nervous her heart rate will increase and she will get short of breath.  She gets nervous.  She was recently changed to Propranolol which seems to be helping. When her heart starts to race her BP goes to 170 over 100.  She has felt most comfortable with Dr. Sepulveda ([facility]) and is scheduled to see him 1/4/23.  She is to see Dr. Marina Suazo cardiologist on 1/11/23.  Past medical history is significant for some panic attacks.  The patient had the J&J vaccine, then the Pfizer booster. She was previously working full time and has now cut down to about 12 hours " a week. Maria Esther Hendrickson feels she is functioning at 50 % of pre COVID level.  Her father  of cancer last year followed by her mother passing from COVID.    OBJECTIVE:     Vitals:  No vitals were obtained today due to virtual visit. Patient states no recent fevers.    Physical Exam   GENERAL: Healthy, alert and no distress  EYES: Eyes grossly normal to inspection.  No discharge or erythema, or obvious scleral/conjunctival abnormalities.  RESP: No audible wheeze, cough, or visible cyanosis.  No visible retractions or increased work of breathing.    SKIN: Visible skin clear. No significant rash, abnormal pigmentation or lesions.  NEURO: Cranial nerves grossly intact.  Mentation and speech appropriate for age.  PSYCH: Mentation appears normal, affect normal/bright, judgement and insight intact, normal speech and appearance well-groomed.      Labs:      I personally reviewed the following lab results today and those on care everywhere, if indicated             No results found for: CRP   No results found for: SED   Lab Results   Component Value Date    WBC 7.7 2018     Lab Results   Component Value Date    RBC 4.64 2018     Lab Results   Component Value Date    HGB 13.0 2018     Lab Results   Component Value Date    HCT 40.4 2018     No components found for: MCT  Lab Results   Component Value Date    MCV 87 2018     Lab Results   Component Value Date    MCH 28.0 2018     Lab Results   Component Value Date    MCHC 32.2 2018     Lab Results   Component Value Date    RDW 13.1 2018     Lab Results   Component Value Date     2018      No results found for: A1C   TSH   Date Value Ref Range Status   2018 0.98 0.40 - 4.00 mU/L Final      No results found for: VITDT   No results for input(s): MAG in the last 53153 hours.  Last Comprehensive Metabolic Panel:  Sodium   Date Value Ref Range Status   2018 138 133 - 144 mmol/L Final     Potassium   Date Value  Ref Range Status   11/08/2018 4.3 3.4 - 5.3 mmol/L Final     Chloride   Date Value Ref Range Status   11/08/2018 105 94 - 109 mmol/L Final     Carbon Dioxide   Date Value Ref Range Status   11/08/2018 25 20 - 32 mmol/L Final     Anion Gap   Date Value Ref Range Status   11/08/2018 8 3 - 14 mmol/L Final     Albumin   Date Value Ref Range Status   11/08/2018 4.0 3.4 - 5.0 g/dL Final     Glucose   Date Value Ref Range Status   11/08/2018 84 70 - 99 mg/dL Final     Comment:     Non Fasting     Urea Nitrogen   Date Value Ref Range Status   11/08/2018 20 7 - 30 mg/dL Final     Creatinine   Date Value Ref Range Status   11/08/2018 0.63 0.52 - 1.04 mg/dL Final     GFR Estimate   Date Value Ref Range Status   11/08/2018 >90 >60 mL/min/1.7m2 Final     Comment:     Non  GFR Calc     Calcium   Date Value Ref Range Status   11/08/2018 9.2 8.5 - 10.1 mg/dL Final     Bilirubin Total   Date Value Ref Range Status   11/08/2018 0.2 0.2 - 1.3 mg/dL Final     Alkaline Phosphatase   Date Value Ref Range Status   11/08/2018 67 40 - 150 U/L Final     ALT   Date Value Ref Range Status   11/08/2018 17 0 - 50 U/L Final     AST   Date Value Ref Range Status   11/08/2018 15 0 - 45 U/L Final      Ref Range & Units 3 mo ago   CORTISOL,TOTAL ug/dL 17.4    Resulting Agency  [facility]   Narrative  Performed by [facility]  Cortisol 8:00  AM - 12:00 PM     (5.0-25.0)   Cortisol 12:00 PM - 8:00  PM     (5.0-15.0)   Cortisol 8:00  PM - 8:00  AM     (< 10.0)  Specimen Collected: 09/19/22  1:14 AM Last Resulted: 09/19/22  6:36 PM   [facility]  Result Received: 12/02/22 11:13 AM         Reviewed labs from [facility] sites and [facility]    I personally reviewed the following imaging results today and those on care everywhere, if indicated     ECHO (report reviewed):   Limited TTE 11/28/22  Final Conclusion  Previous Study: 11-    Visually Estimated EF: 55-60%    Normal global left ventricular size, wall thickness, and ejection function with no obvious   regional wall motion/thickening abnormalities.    Normal left atrial size.    Mild aortic regurgitation.    Very small pericardial effusion seen.    Dilated IVC size, >50% collapse with respiration.      TTE 11/15/22  Final Conclusion Previous Study: 3-    Visually Estimated EF: 55-60%    Normal left ventricular size, wall thickness, and global systolic function with no obvious   regional wall motion abnormalities.    Normal right ventricular size and function.    No significant valvular heart disease noted.    Small pericardial effusion.    Dilated IVC size, <50% collapse with respiration.    No prior studies available for comparison        Cardiac MRI 9/13/22  Final conclusions:   1. The gadolinium delay enhancement shows no scar/fibrosis/inflammation in   the ventricular myocardium.   2. The left ventricle size is normal.  The LV wall thickness is normal.     There is no LV wall motion abnormality. The LV systolic function is   normal.  Calculated LVEF is 57%.     3. The right ventricle is normal in size and wall motion. RV systolic   function is preserved (calclated RVEF 53%).   4. No significant valve dysfunction noted.   5. Pericardium is within normal limits (no significant delayed enhancement   or effusion); no evidence of constriction on real time imaging.   6. Non cardiac finding reported separately below, per Radiology.       Cardiac cath: from 9/13/ demonstrated   Calcium Score: 0   Left Main: 0   LAD: 0   LCX:  0   RCA: 0     Calcium Score Percentile: 0   This means that the 0% of asymptomatic people of the same age, gender, and   race will have a lower calcium score.      Reviewed imaging from [facility] sites and [facility]     Medical Records Reviewed:    Reviewed consults/documents from [facility] and [facility] including ED  and Cardiology     Impression:  1. Heart racing  2. Dizziness  3. Post acute sequelae of COVID (Long COVID syndrome)  4. Anxiety  5. Depression?      Plan:  1. I reviewed present knowledge on long-Covid.  Education was provided and questions were answered.  2. PT/OT for post COVID and to work in how to function with symptoms.  3. To see Dr. Sepulveda and Dr. Gray.  4. Referral to Health Psychology.  5. Continue to measure BP when heart racing. Keep diary.    6. I will follow up with Maria Esther Hendrickson in 3 months. I will review progress and consider need for any other therapeutic interventions. If there are any questions and/or concerns she will call the clinic.      On day of encounter time spent in chart review and with patient in consultation, exam, education and coordination of care, excluding procedures:   [id number]     Irma Escalante MD, FAAPMR   Department Rehabilitation Medicine-Wound Fellowship Director  Adjunct  Department Family Medicine and Community Health  [facility]

## 2022-12-27 NOTE — PATIENT INSTRUCTIONS
Plan and Recommendations:    PT/OT for post COVID and to work in how to function with symptoms.  To see Dr. Sepulveda and Dr. Gray.  Referral to Health Psychology.  Continue to measure BP when heart racing. Keep diary.    I will follow up with Maria Esther Hendrickson in 3 months. I will review progress and consider need for any other therapeutic interventions. If there are any questions and/or concerns she will call the clinic.

## 2023-01-04 ENCOUNTER — THERAPY VISIT (OUTPATIENT)
Dept: OCCUPATIONAL THERAPY | Facility: CLINIC | Age: 43
End: 2023-01-04
Attending: PHYSICAL MEDICINE & REHABILITATION
Payer: COMMERCIAL

## 2023-01-04 DIAGNOSIS — Z74.09 POST-COVID CHRONIC DECREASED MOBILITY AND ENDURANCE: ICD-10-CM

## 2023-01-04 DIAGNOSIS — R00.0 RACING HEART BEAT: ICD-10-CM

## 2023-01-04 DIAGNOSIS — U09.9 LONG COVID: Primary | ICD-10-CM

## 2023-01-04 DIAGNOSIS — U09.9 POST-COVID CHRONIC ANXIETY: ICD-10-CM

## 2023-01-04 DIAGNOSIS — R42 DIZZINESS: ICD-10-CM

## 2023-01-04 DIAGNOSIS — G90.A POTS (POSTURAL ORTHOSTATIC TACHYCARDIA SYNDROME): ICD-10-CM

## 2023-01-04 DIAGNOSIS — F41.9 POST-COVID CHRONIC ANXIETY: ICD-10-CM

## 2023-01-04 DIAGNOSIS — U09.9 POST-COVID CHRONIC DECREASED MOBILITY AND ENDURANCE: ICD-10-CM

## 2023-01-04 PROCEDURE — 97535 SELF CARE MNGMENT TRAINING: CPT | Mod: GO

## 2023-01-04 PROCEDURE — 97165 OT EVAL LOW COMPLEX 30 MIN: CPT | Mod: GO

## 2023-01-04 ASSESSMENT — ACTIVITIES OF DAILY LIVING (ADL): IADL_QUICK_ADDS: MEAL PLANNING/PREPARATION;HOME/FINANCIAL/MANAGEMENT;COMMUNITY MOBILITY

## 2023-01-04 NOTE — PROGRESS NOTES
[facility]          OUTPATIENT OCCUPATIONAL THERAPY  EVALUATION  PLAN OF TREATMENT FOR OUTPATIENT REHABILITATION  (COMPLETE FOR INITIAL CLAIMS ONLY)  Patient's Last Name, First Name, M.I.  YOB: 1980  MadhaviMaria Esther  LV                        Provider's Name  [facility] Medical Record No.  [id number]                               Onset Date:     12/27/22 (order date)   Start of Care Date:     01/04/23   Type:     ___PT   _X_OT   ___SLP Medical Diagnosis:     Racing heart beat (R00.0)  - Primary   Long COVID (U09.9)                          OT Diagnosis:     impaired ease, efficiency, and independence in IADL's Visits from SOC:  1   _________________________________________________________________________________  Plan of Treatment/Functional Goals:  ADL training, IADL training, Self care/Home management     Pt benefits from skilled outpatient occupational therapy for education regarding strategies to aide attention/concentration and energy conservation/work simplificaiton and activity modification as  to maximize independence in ADL/IADL's              Goals  Goal Identifier: Energy Concervation/Work Simplification  Goal Description: Patient will verbalize or demonstrate understanding of two or three work simplification and energy conservation techniques during self-care activities, community activities, and laundry and other homemaking tasks by discharge  Target Date: 01/04/23  Date Met: 01/04/23  Goal Identifier: Concentration  Goal Description: Pt will verbalize understanding about strategies to help with improving attention (concentration) to maximize performance in workand daily activities.  Target Date: 01/04/23  Date Met: 01/04/23                                             Therapy Frequency: 1 visit     Predicted Duration of Therapy Intervention (days/wks): 1  visit  Binta Sullivan, OTR          I CERTIFY THE NEED FOR THESE SERVICES FURNISHED UNDER        THIS PLAN OF TREATMENT AND WHILE UNDER MY CARE     (Physician co-signature of this document indicates review and certification of the therapy plan).               ,    Certification date from: 01/04/23, Certification date to: 04/04/23               Referring Physician: Irma Escalante MD     Initial Assessment        See Epic Evaluation      Start Of Care Date: 01/04/23

## 2023-01-28 ENCOUNTER — HEALTH MAINTENANCE LETTER (OUTPATIENT)
Age: 43
End: 2023-01-28

## 2023-01-30 ENCOUNTER — THERAPY VISIT (OUTPATIENT)
Dept: PHYSICAL THERAPY | Facility: CLINIC | Age: 43
End: 2023-01-30
Attending: PHYSICAL MEDICINE & REHABILITATION
Payer: COMMERCIAL

## 2023-01-30 DIAGNOSIS — U09.9 POST-COVID CHRONIC DECREASED MOBILITY AND ENDURANCE: ICD-10-CM

## 2023-01-30 DIAGNOSIS — R00.0 RACING HEART BEAT: ICD-10-CM

## 2023-01-30 DIAGNOSIS — G90.A POTS (POSTURAL ORTHOSTATIC TACHYCARDIA SYNDROME): ICD-10-CM

## 2023-01-30 DIAGNOSIS — F41.9 POST-COVID CHRONIC ANXIETY: ICD-10-CM

## 2023-01-30 DIAGNOSIS — U09.9 POST-COVID CHRONIC ANXIETY: ICD-10-CM

## 2023-01-30 DIAGNOSIS — U09.9 LONG COVID: ICD-10-CM

## 2023-01-30 DIAGNOSIS — Z74.09 POST-COVID CHRONIC DECREASED MOBILITY AND ENDURANCE: ICD-10-CM

## 2023-01-30 DIAGNOSIS — R42 DIZZINESS: ICD-10-CM

## 2023-01-30 PROCEDURE — 97110 THERAPEUTIC EXERCISES: CPT | Mod: GP | Performed by: PHYSICAL THERAPIST

## 2023-01-30 PROCEDURE — 97161 PT EVAL LOW COMPLEX 20 MIN: CPT | Mod: GP | Performed by: PHYSICAL THERAPIST

## 2023-01-30 ASSESSMENT — 6 MINUTE WALK TEST (6MWT): TOTAL DISTANCE WALKED (FT): 650

## 2023-01-31 NOTE — PROGRESS NOTES
[facility]                                                                                   OUTPATIENT PHYSICAL THERAPY FUNCTIONAL EVALUATION  PLAN OF TREATMENT FOR OUTPATIENT REHABILITATION  (COMPLETE FOR INITIAL CLAIMS ONLY)  Patient's Last Name, First Name, M.I.  YOB: 1980  Maria Esther Hendrickson     Provider's Name   [facility]   Medical Record No.  [id number]     Start of Care Date:  01/30/23   Onset Date:  08/01/22   Type:     _X__PT   ____OT  ____SLP Medical Diagnosis:  POTS, long COVID     PT Diagnosis:  deconditioned Visits from SOC:  1                              __________________________________________________________________________________  Plan of Treatment/Functional Goals:  gait training, neuromuscular re-education, strengthening           GOALS  HEP/PA  Patient will return to gym to work towards endurance goals; confidently performing up to 20 min bouts on the treadmill in order for carryover of community ambulation and involvement.  04/29/23    endurance  Patient will be able to complete 6MWT and achieve age matched norms of five times STS test indicating significant improvements in functional endurance necessary for return to prior level of function and full independence.  04/29/23            Therapy Frequency:  other (see comments)   Predicted Duration of Therapy Intervention:  check back in 4-6 wks for program progressions    VICTOR M ALAN, PT, DPT                                    I CERTIFY THE NEED FOR THESE SERVICES FURNISHED UNDER        THIS PLAN OF TREATMENT AND WHILE UNDER MY CARE     (Physician co-signature of this document indicates review and certification of the therapy plan).              Certification Date From:  01/30/23   Certification Date To:  04/29/23    Referring Provider:  Irma Escalante    Initial Assessment  See Epic  Evaluation- Start of Care Date: 01/30/23

## 2023-03-27 SDOH — SOCIAL STABILITY: SOCIAL NETWORK: PROMIS ABILITY TO PARTICIPATE IN SOCIAL ROLES & ACTIVITIES T-SCORE: 64.1

## 2023-03-27 SDOH — SOCIAL STABILITY: SOCIAL NETWORK: I HAVE TROUBLE DOING ALL OF THE ACTIVITIES WITH FRIENDS THAT I WANT TO DO: NEVER

## 2023-03-27 SDOH — SOCIAL STABILITY: SOCIAL NETWORK: I HAVE TROUBLE DOING ALL OF THE FAMILY ACTIVITIES THAT I WANT TO DO: NEVER

## 2023-03-27 SDOH — SOCIAL STABILITY: SOCIAL NETWORK

## 2023-03-27 SDOH — SOCIAL STABILITY: SOCIAL NETWORK: I HAVE TROUBLE DOING ALL OF MY USUAL WORK (INCLUDE WORK AT HOME): NEVER

## 2023-03-27 SDOH — SOCIAL STABILITY: SOCIAL NETWORK: I HAVE TROUBLE DOING ALL OF MY REGULAR LEISURE ACTIVITIES WITH OTHERS: NEVER

## 2023-03-27 ASSESSMENT — ANXIETY QUESTIONNAIRES
IF YOU CHECKED OFF ANY PROBLEMS ON THIS QUESTIONNAIRE, HOW DIFFICULT HAVE THESE PROBLEMS MADE IT FOR YOU TO DO YOUR WORK, TAKE CARE OF THINGS AT HOME, OR GET ALONG WITH OTHER PEOPLE: NOT DIFFICULT AT ALL
5. BEING SO RESTLESS THAT IT IS HARD TO SIT STILL: NOT AT ALL
4. TROUBLE RELAXING: NOT AT ALL
2. NOT BEING ABLE TO STOP OR CONTROL WORRYING: NOT AT ALL
GAD7 TOTAL SCORE: 0
7. FEELING AFRAID AS IF SOMETHING AWFUL MIGHT HAPPEN: NOT AT ALL
7. FEELING AFRAID AS IF SOMETHING AWFUL MIGHT HAPPEN: NOT AT ALL
3. WORRYING TOO MUCH ABOUT DIFFERENT THINGS: NOT AT ALL
GAD7 TOTAL SCORE: 0
GAD7 TOTAL SCORE: 0
6. BECOMING EASILY ANNOYED OR IRRITABLE: NOT AT ALL
8. IF YOU CHECKED OFF ANY PROBLEMS, HOW DIFFICULT HAVE THESE MADE IT FOR YOU TO DO YOUR WORK, TAKE CARE OF THINGS AT HOME, OR GET ALONG WITH OTHER PEOPLE?: NOT DIFFICULT AT ALL
1. FEELING NERVOUS, ANXIOUS, OR ON EDGE: NOT AT ALL

## 2023-03-27 ASSESSMENT — ENCOUNTER SYMPTOMS
PALPITATIONS: 1
SLEEP DISTURBANCES DUE TO BREATHING: 0
TINGLING: 0
NUMBNESS: 0
DIZZINESS: 1
SPUTUM PRODUCTION: 0
DISTURBANCES IN COORDINATION: 0
SYNCOPE: 0
ORTHOPNEA: 0
COUGH DISTURBING SLEEP: 0
POSTURAL DYSPNEA: 0
HYPOTENSION: 1
HEMOPTYSIS: 0
LIGHT-HEADEDNESS: 1
SNORES LOUDLY: 0
TREMORS: 0
WEAKNESS: 1
PARALYSIS: 0
COUGH: 0
SHORTNESS OF BREATH: 1
SEIZURES: 0
MEMORY LOSS: 0
DYSPNEA ON EXERTION: 0
SPEECH CHANGE: 0
WHEEZING: 0
HYPERTENSION: 0
HEADACHES: 0
LOSS OF CONSCIOUSNESS: 0
EXERCISE INTOLERANCE: 1
LEG PAIN: 1

## 2023-03-27 ASSESSMENT — PATIENT HEALTH QUESTIONNAIRE - PHQ9
10. IF YOU CHECKED OFF ANY PROBLEMS, HOW DIFFICULT HAVE THESE PROBLEMS MADE IT FOR YOU TO DO YOUR WORK, TAKE CARE OF THINGS AT HOME, OR GET ALONG WITH OTHER PEOPLE: NOT DIFFICULT AT ALL
SUM OF ALL RESPONSES TO PHQ QUESTIONS 1-9: 0
SUM OF ALL RESPONSES TO PHQ QUESTIONS 1-9: 0

## 2023-03-27 NOTE — PROGRESS NOTES
Video-Visit Details    Type of service:  Video Visit    Originating Location (pt. Location): Home    Distant Location (provider location):  Off-site    Platform used for Video Visit: Cindy      PHQ Assesment Total Score(s) 12/27/2022   PHQ-9 Score 6   Some recent data might be hidden      JESSICA-7 Results 12/27/2022   JESSICA 7 TOTAL SCORE 7 (mild anxiety)   Some recent data might be hidden      PTSD Screen Score 12/27/2022   Have you ever experienced this kind of event? No   Some recent data might be hidden      12/27/22     Scoring Physical Function (higher score better) (range: 4 - 20) 6 (   Sum of 4 Physical Function Questions)   Scoring Anxiety (lower score better) (range: 4 - 20) 11 (   Sum of 4 Anxiety Questions)   Scoring Depression (lower score better) (range: 4 - 20) 10 (   Sum of 4 Depression Questions)   Scoring Fatigue (lower score better) (range: 4 - 20) 17 (   Sum of 4 Fatigue [facility] )   Scoring Sleep Disturbance (lower score better) (range: 4 - 20) 14 (   Sum of 4 Sleep Disturbance Questions)   Scoring Satisfaction with Social Role (higher score better) (range: 4 - 20) 17 (   Sum of 4 Satisfaction with Social Role Questions)   Scoring Pain Interference (lower score better) (range: 4 - 20) 16 (   Sum of 4 Pain Interference Questions)         PHQ Assesment Total Score(s) 3/27/2023   PHQ-9 Score 0   Some recent data might be hidden     JESSICA-7 Results 3/27/2023   JESSICA 7 TOTAL SCORE 0 (minimal anxiety)   Some recent data might be hidden     PTSD Screen Score 3/27/2023   Have you ever experienced this kind of event? No   Some recent data might be hidden     PROMIS-29 3/27/2023   PROMIS Physical Function T-Score 41.8 (mild dysfunction)   PROMIS Anxiety T-Score 55.8   PROMIS Depression T-Score 41 (within normal limits)   PROMIS Fatigue T-Score 46 (within normal limits)   PROMIS Sleep Disturbance T-Score 50.5 (within normal limits)   PROMIS Ability to Participate in Social Roles & Activities T-Score 64.1 (within  normal limits)   PROMIS Pain Interference T-Score 41.6 (within normal limits)   PROMIS Pain Intensity 0      Questionnaire measures much improved.     Answers for HPI/ROS submitted by the patient on 3/27/2023  If you checked off any problems, how difficult have these problems made it for you to do your work, take care of things at home, or get along with other people?: Not difficult at all  PHQ9 TOTAL SCORE: 0  JESSICA 7 TOTAL SCORE: 0  General Symptoms: No  Skin Symptoms: No  HENT Symptoms: No  EYE SYMPTOMS: No  HEART SYMPTOMS: Yes  LUNG SYMPTOMS: Yes  INTESTINAL SYMPTOMS: No  URINARY SYMPTOMS: No  GYNECOLOGIC SYMPTOMS: No  BREAST SYMPTOMS: No  SKELETAL SYMPTOMS: No  BLOOD SYMPTOMS: No  NERVOUS SYSTEM SYMPTOMS: Yes  MENTAL HEALTH SYMPTOMS: No  Chest pain or pressure: Yes  Fast or irregular heartbeat: Yes  Pain in legs with walking: Yes  Trouble breathing while lying down: No  Fingers or toes appear blue: No  High blood pressure: No  Low blood pressure: Yes  Fainting: No  Murmurs: No  Pacemaker: No  Varicose veins: No  Edema or swelling: No  Wake up at night with shortness of breath: No  Light-headedness: Yes  Exercise intolerance: Yes  Cough: No  Sputum or phlegm: No  Coughing up blood: No  Difficulty breating or shortness of breath: Yes  Snoring: No  Wheezing: No  Difficulty breathing on exertion: No  Nighttime Cough: No  Difficulty breathing when lying flat: No  Trouble with coordination: No  Dizziness or trouble with balance: Yes  Fainting or black-out spells: No  Memory loss: No  Headache: No  Seizures: No  Speech problems: No  Tingling: No  Tremor: No  Weakness: Yes  Difficulty walking: No  Paralysis: No  Numbness: No      Past Medical History:    Past Medical History:   Diagnosis Date     POTS (postural orthostatic tachycardia syndrome)         Surgical History:   Past Surgical History:   Procedure Laterality Date     APPENDECTOMY  06/2014     AS HYSTEROSCOPY W ENDOMETRIAL BX/POLYPECTOMY W/WO D&C N/A 03/2018         Medications:    Current Outpatient Medications   Medication     acetaminophen (TYLENOL) 500 MG tablet     aspirin (ASA) 81 MG EC tablet     ibuprofen (ADVIL/MOTRIN) 800 MG tablet     meloxicam (MOBIC) 15 MG tablet     propranolol ER (INDERAL LA) 60 MG 24 hr capsule     No current facility-administered medications for this visit.        Allergies:    Allergies   Allergen Reactions     Contrast Dye      No Clinical Screening - See Comments Other (See Comments)     LW Other1: -seasonal     Diatrizoate Itching     Medicated with 50mg IV Benadryl on 2- and pt sneezed three times after injection but said it was much better then last time.  Jessica Pepper ....................  2/27/2015   4:02 PM        Family history: No family history on file.     Social History:   Social History     Tobacco Use     Smoking status: Never     Smokeless tobacco: Never   Substance Use Topics     Alcohol use: No     Drug use: No          SUBJECTIVE:     Initial: 12/27/22    This 42 year old female presents to the [facility] Rehabilitation Medicine Post-COVID clinic as a new consult to evaluate continuing symptoms after COVID infection initially diagnosed 8/16/22.  She experienced body aches, fever to 104 with chills.  She did better after about a week.  She then got recurrent symptoms similar as before but now with cough. She was treated OTC and steroids which helped with the cough. She was doing well until 8/12/22 and was working as an .   She then started to feel her heart racing very high when just sitting.  Her hands and face got numb and she felt faint.  She called 911.  She was short of breath.  She was taken to ED.  She went to [facility].  Her labs were normal.  Her BP was high and her heart rate was high.  She was short of breath and was admitted.  She was started on Metoprolol. She was discharged on the 14th.  She followed with Cardiology at [facility].  Cardiac MRI was reviewed. It was felt she had  "pericarditis. Colchicine was started with some benefit.   The Metoprolol continued to give her shortness of breath and \"not feeling good\".  She was seen again by Cardiology and diagnosed with pericardial effusion. She was then was seen by [facility] and was told she may have POTS.   She eventually saw Dr. Rocha on 22 who also felt she had POTS.  She was instructed to start ivabrabine (which she is not taking), wean Metoprolol, avoid caffeine and f/u 6 weeks.  Continuing symptoms include palpitations, dizziness, heart racing, distractibility, decreased/abnormal taste and anxiety.   She feels her life has changed greatly and she is very very nervous her heart rate will increase and she will get short of breath.  She gets nervous.  She was recently changed to Propranolol which seems to be helping. When her heart starts to race her BP goes to 170 over 100.  She has felt most comfortable with Dr. Sepulveda ([facility]) and is scheduled to see him 23.  She is to see Dr. Gray Electro cardiologist on 23.  Past medical history is significant for some panic attacks.  The patient had the J&J vaccine, then the Pfizer booster. She was previously working full time and has now cut down to about 12 hours a week. Maria Esther Hendrickson feels she is functioning at 50 % of pre COVID level.  Her father  of cancer last year followed by her mother passing from COVID.     Update: 3/28/23    Maria Esther Hendrickson returns to the [facility] Post-COVID clinic for follow up of continuing symptoms after COVID infection.  History is as noted above.  Most recent treatment plan ordered was post COVID programs PT/OT, referral to Health Psychology, continue follow up with Dr. Lopez [facility] and to see Dr. Gray Electrocardiology and to self monitor BP and P.  She has been working with Dr. Gray and Dr. Lopez.  Extended release Propranolol was started with improvement and she was later switched " "to short acting Propranolol.  She has seen PT and OT. Today Maria Esther Hendrickson reports she is feeling much better with less palpitations.  She feels like \"it is slowly going out of her body\".    She still gets the anxiety and fears about leaving her home, but it is better.  She will be following up with Dr. Lopez.  She has not made an appointment with Health Psychology.  As an  she is busy with tax season but is able to control her hours. She is integrating into her life the techniques taught by OT and PT.  She has been fasting with Ramadan and find in the last two days she is getting more heart symptoms.    OBJECTIVE:     Vitals:  No vitals were obtained today due to virtual visit.  Patient states they have not experienced any fevers.    Physical Exam   GENERAL: Healthy, alert and no distress  EYES: Eyes grossly normal to inspection.  No discharge or erythema, or obvious scleral/conjunctival abnormalities.  RESP: No audible wheeze, cough, or visible cyanosis.  No visible retractions or increased work of breathing.    SKIN: Visible skin clear. No significant rash, abnormal pigmentation or lesions.  NEURO: Cranial nerves grossly intact.  Mentation and speech appropriate for age.  PSYCH: Mentation appears normal, affect normal/bright, judgement and insight intact, normal speech and appearance well-groomed.      Labs:      I personally reviewed the following lab results today and those on care everywhere, if indicated     No results found for: CRP   No results found for: SED     Lab Results   Component Value Date    WBC 7.7 11/08/2018     Lab Results   Component Value Date    RBC 4.64 11/08/2018     Lab Results   Component Value Date    HGB 13.0 11/08/2018     Lab Results   Component Value Date    HCT 40.4 11/08/2018     No components found for: MCT  Lab Results   Component Value Date    MCV 87 11/08/2018     Lab Results   Component Value Date    MCH 28.0 11/08/2018     Lab Results   Component Value Date    MCHC " 32.2 11/08/2018     Lab Results   Component Value Date    RDW 13.1 11/08/2018     Lab Results   Component Value Date     11/08/2018      No results found for: A1C   TSH   Date Value Ref Range Status   11/08/2018 0.98 0.40 - 4.00 mU/L Final      No results found for: VITDT   Last Comprehensive Metabolic Panel:  Sodium   Date Value Ref Range Status   11/08/2018 138 133 - 144 mmol/L Final     Potassium   Date Value Ref Range Status   11/08/2018 4.3 3.4 - 5.3 mmol/L Final     Chloride   Date Value Ref Range Status   11/08/2018 105 94 - 109 mmol/L Final     Carbon Dioxide   Date Value Ref Range Status   11/08/2018 25 20 - 32 mmol/L Final     Anion Gap   Date Value Ref Range Status   11/08/2018 8 3 - 14 mmol/L Final     Albumin   Date Value Ref Range Status   11/08/2018 4.0 3.4 - 5.0 g/dL Final     Glucose   Date Value Ref Range Status   11/08/2018 84 70 - 99 mg/dL Final     Comment:     Non Fasting     Urea Nitrogen   Date Value Ref Range Status   11/08/2018 20 7 - 30 mg/dL Final     Creatinine   Date Value Ref Range Status   11/08/2018 0.63 0.52 - 1.04 mg/dL Final     GFR Estimate   Date Value Ref Range Status   11/08/2018 >90 >60 mL/min/1.7m2 Final     Comment:     Non  GFR Calc     Calcium   Date Value Ref Range Status   11/08/2018 9.2 8.5 - 10.1 mg/dL Final     Bilirubin Total   Date Value Ref Range Status   11/08/2018 0.2 0.2 - 1.3 mg/dL Final     Alkaline Phosphatase   Date Value Ref Range Status   11/08/2018 67 40 - 150 U/L Final     ALT   Date Value Ref Range Status   11/08/2018 17 0 - 50 U/L Final     AST   Date Value Ref Range Status   11/08/2018 15 0 - 45 U/L Final     No results for input(s): MAG in the last [id number] hours.        No lab results found.    Reviewed labs from [facility] sites and [facility]     Imaging:    I personally reviewed the following imaging results today and those on care everywhere, if indicated     1/5/23 Transthoracic echo-normal except for small stable 4  mm loculated pericardial effusion.    Reviewed imaging from [facility] sites and [facility]     Medical Records Reviewed:    Reviewed consults/documents from [facility] and [facility] including Primary Care, Cardiology, PT and OT     Impression:  1. Heart racing-much improved-near resolved  2. Dizziness-much improved  3. POTS-improving  4. Post acute sequelae of COVID (Long COVID syndrome)  5. Anxiety-continues     Plan:  1. Questions were answered.  2. Continue to use techniques as taught by therapies (PT/OT)  3. Continue to work with Dr. Sepulveda and Dr. Gray.  4. Referral to Health Psychology-we discussed and she agrees.  She still has anxiety and fears over what has happened..  5. Apps for anxiety management provided.  6. With heart symptoms increased during day fasting we discussed that she will probably need to drink during day as she may be getting dehydrated.  If that is not effective than adding juices to decrease risk of concurrent hypoglycemia.  If this is not effective she may need to not fast this year.  She will see how things go.    7. I will follow up with Maria Esther Hendrickson in 3 months. I will review progress and consider need for any other therapeutic interventions. If there are any questions and/or concerns she will call the clinic.        On day of encounter time spent in chart review and with patient in consultation, exam, education and coordination of care, excluding procedures:  34 minutes     Irma Escalante MD, FAAPMR   Department Rehabilitation Medicine-Wound Fellowship Director  Adjunct  Department Family Medicine and Community Health  [employer]

## 2023-03-28 ENCOUNTER — TELEPHONE (OUTPATIENT)
Dept: PHYSICAL MEDICINE AND REHAB | Facility: CLINIC | Age: 43
End: 2023-03-28

## 2023-03-28 ENCOUNTER — VIRTUAL VISIT (OUTPATIENT)
Dept: PHYSICAL MEDICINE AND REHAB | Facility: CLINIC | Age: 43
End: 2023-03-28
Payer: COMMERCIAL

## 2023-03-28 DIAGNOSIS — U09.9 LONG COVID: ICD-10-CM

## 2023-03-28 DIAGNOSIS — R42 DIZZINESS: ICD-10-CM

## 2023-03-28 DIAGNOSIS — G90.A POTS (POSTURAL ORTHOSTATIC TACHYCARDIA SYNDROME): ICD-10-CM

## 2023-03-28 DIAGNOSIS — R00.0 RACING HEART BEAT: Primary | ICD-10-CM

## 2023-03-28 DIAGNOSIS — U09.9 POST-COVID CHRONIC ANXIETY: ICD-10-CM

## 2023-03-28 DIAGNOSIS — F41.9 POST-COVID CHRONIC ANXIETY: ICD-10-CM

## 2023-03-28 PROCEDURE — 99214 OFFICE O/P EST MOD 30 MIN: CPT | Mod: VID | Performed by: PHYSICAL MEDICINE & REHABILITATION

## 2023-03-28 NOTE — PATIENT INSTRUCTIONS
Applications for Sleep and Anxiety:    Phone Apps:    Calm  Insight Timer       Plan and Recommendations:     Continue to use techniques as taught by therapies (PT/OT)  Continue to work with Dr. Sepulveda and Dr. Gray.  Referral to Health Psychology-we discussed and she agrees.  She still has anxiety and fears over what has happened..  Apps for anxiety management provided.  With heart symptoms increased during day fasting we discussed that she will probably need to drink during day as she may be getting dehydrated.  If that is not effective than adding juices to decrease risk of concurrent hypoglycemia.  If this is not effective she may need to not fast this year.  She will see how things go.    I will follow up with Maria Esther Hendrickson in 3 months. I will review progress and consider need for any other therapeutic interventions. If there are any questions and/or concerns she will call the clinic.

## 2023-03-28 NOTE — LETTER
3/28/2023       RE: Maria Esther Hendrickson  [address] [place] [address]     Dear Colleague,    Thank you for referring your patient, Maria Esther Hendrickson, to the [facility] PHYSICAL MEDICINE AND REHABILITATION CLINIC [place] at [facility]. Please see a copy of my visit note below.    Video-Visit Details    Type of service:  Video Visit    Originating Location (pt. Location): Home    Distant Location (provider location):  Off-site    Platform used for Video Visit: Cindy      PHQ Assesment Total Score(s) 12/27/2022   PHQ-9 Score 6   Some recent data might be hidden      JESSICA-7 Results 12/27/2022   JESSICA 7 TOTAL SCORE 7 (mild anxiety)   Some recent data might be hidden      PTSD Screen Score 12/27/2022   Have you ever experienced this kind of event? No   Some recent data might be hidden      12/27/22     Scoring Physical Function (higher score better) (range: 4 - 20) 6 (   Sum of 4 Physical Function Questions)   Scoring Anxiety (lower score better) (range: 4 - 20) 11 (   Sum of 4 Anxiety Questions)   Scoring Depression (lower score better) (range: 4 - 20) 10 (   Sum of 4 Depression Questions)   Scoring Fatigue (lower score better) (range: 4 - 20) 17 (   Sum of 4 Fatigue [place] )   Scoring Sleep Disturbance (lower score better) (range: 4 - 20) 14 (   Sum of 4 Sleep Disturbance Questions)   Scoring Satisfaction with Social Role (higher score better) (range: 4 - 20) 17 (   Sum of 4 Satisfaction with Social Role Questions)   Scoring Pain Interference (lower score better) (range: 4 - 20) 16 (   Sum of 4 Pain Interference Questions)         PHQ Assesment Total Score(s) 3/27/2023   PHQ-9 Score 0   Some recent data might be hidden     JESSICA-7 Results 3/27/2023   JESSICA 7 TOTAL SCORE 0 (minimal anxiety)   Some recent data might be hidden     PTSD Screen Score 3/27/2023   Have you ever experienced this kind of event? No   Some recent data might be hidden     PROMIS-29 3/27/2023    PROMIS Physical Function T-Score 41.8 (mild dysfunction)   PROMIS Anxiety T-Score 55.8   PROMIS Depression T-Score 41 (within normal limits)   PROMIS Fatigue T-Score 46 (within normal limits)   PROMIS Sleep Disturbance T-Score 50.5 (within normal limits)   PROMIS Ability to Participate in Social Roles & Activities T-Score 64.1 (within normal limits)   PROMIS Pain Interference T-Score 41.6 (within normal limits)   PROMIS Pain Intensity 0      Questionnaire measures much improved.     Answers for HPI/ROS submitted by the patient on 3/27/2023  If you checked off any problems, how difficult have these problems made it for you to do your work, take care of things at home, or get along with other people?: Not difficult at all  PHQ9 TOTAL SCORE: 0  JESSICA 7 TOTAL SCORE: 0  General Symptoms: No  Skin Symptoms: No  HENT Symptoms: No  EYE SYMPTOMS: No  HEART SYMPTOMS: Yes  LUNG SYMPTOMS: Yes  INTESTINAL SYMPTOMS: No  URINARY SYMPTOMS: No  GYNECOLOGIC SYMPTOMS: No  BREAST SYMPTOMS: No  SKELETAL SYMPTOMS: No  BLOOD SYMPTOMS: No  NERVOUS SYSTEM SYMPTOMS: Yes  MENTAL HEALTH SYMPTOMS: No  Chest pain or pressure: Yes  Fast or irregular heartbeat: Yes  Pain in legs with walking: Yes  Trouble breathing while lying down: No  Fingers or toes appear blue: No  High blood pressure: No  Low blood pressure: Yes  Fainting: No  Murmurs: No  Pacemaker: No  Varicose veins: No  Edema or swelling: No  Wake up at night with shortness of breath: No  Light-headedness: Yes  Exercise intolerance: Yes  Cough: No  Sputum or phlegm: No  Coughing up blood: No  Difficulty breating or shortness of breath: Yes  Snoring: No  Wheezing: No  Difficulty breathing on exertion: No  Nighttime Cough: No  Difficulty breathing when lying flat: No  Trouble with coordination: No  Dizziness or trouble with balance: Yes  Fainting or black-out spells: No  Memory loss: No  Headache: No  Seizures: No  Speech problems: No  Tingling: No  Tremor: No  Weakness: Yes  Difficulty  walking: No  Paralysis: No  Numbness: No      Past Medical History:    Past Medical History:   Diagnosis Date     POTS (postural orthostatic tachycardia syndrome)         Surgical History:   Past Surgical History:   Procedure Laterality Date     APPENDECTOMY  06/2014     AS HYSTEROSCOPY W ENDOMETRIAL BX/POLYPECTOMY W/WO D&C N/A 03/2018        Medications:    Current Outpatient Medications   Medication     acetaminophen (TYLENOL) 500 MG tablet     aspirin (ASA) 81 MG EC tablet     ibuprofen (ADVIL/MOTRIN) 800 MG tablet     meloxicam (MOBIC) 15 MG tablet     propranolol ER (INDERAL LA) 60 MG 24 hr capsule     No current facility-administered medications for this visit.        Allergies:    Allergies   Allergen Reactions     Contrast Dye      No Clinical Screening - See Comments Other (See Comments)     LW Other1: -seasonal     Diatrizoate Itching     Medicated with 50mg IV Benadryl on 2- and pt sneezed three times after injection but said it was much better then last time.  Jessica Pepper ....................  2/27/2015   4:02 PM        Family history: No family history on file.     Social History:   Social History     Tobacco Use     Smoking status: Never     Smokeless tobacco: Never   Substance Use Topics     Alcohol use: No     Drug use: No          SUBJECTIVE:     Initial: 12/27/22    This 42 year old female presents to the [facility] Rehabilitation Medicine Post-COVID clinic as a new consult to evaluate continuing symptoms after COVID infection initially diagnosed 8/16/22.  She experienced body aches, fever to 104 with chills.  She did better after about a week.  She then got recurrent symptoms similar as before but now with cough. She was treated OTC and steroids which helped with the cough. She was doing well until 8/12/22 and was working as an .   She then started to feel her heart racing very high when just sitting.  Her hands and face got numb and she felt faint.  She called  "911.  She was short of breath.  She was taken to ED.  She went to [facility].  Her labs were normal.  Her BP was high and her heart rate was high.  She was short of breath and was admitted.  She was started on Metoprolol. She was discharged on the .  She followed with Cardiology at [facility].  Cardiac MRI was reviewed. It was felt she had pericarditis. Colchicine was started with some benefit.   The Metoprolol continued to give her shortness of breath and \"not feeling good\".  She was seen again by Cardiology and diagnosed with pericardial effusion. She was then was seen by [facility] Cardiology and was told she may have POTS.   She eventually saw Dr. Rocha on 22 who also felt she had POTS.  She was instructed to start ivabrabine (which she is not taking), wean Metoprolol, avoid caffeine and f/u 6 weeks.  Continuing symptoms include palpitations, dizziness, heart racing, distractibility, decreased/abnormal taste and anxiety.   She feels her life has changed greatly and she is very very nervous her heart rate will increase and she will get short of breath.  She gets nervous.  She was recently changed to Propranolol which seems to be helping. When her heart starts to race her BP goes to 170 over 100.  She has felt most comfortable with Dr. Sepulveda ([facility]) and is scheduled to see him 23.  She is to see Dr. Marina Suazo cardiologist on 23.  Past medical history is significant for some panic attacks.  The patient had the J&J vaccine, then the Pfizer booster. She was previously working full time and has now cut down to about 12 hours a week. Maria Esther Hendrickson feels she is functioning at 50 % of pre COVID level.  Her father  of cancer last year followed by her mother passing from COVID.     Update: 3/28/23    Maria Esther Hendrickson returns to the [facility] Rehabilitation Medicine Post-COVID clinic for follow up of continuing symptoms after COVID infection.  History is as noted above.  Most recent " "treatment plan ordered was post [facility] programs PT/OT, referral to Health Psychology, continue follow up with Dr. Lopez Cardiology [place] and to see Dr. Gray Electrocardiology and to self monitor BP and P.  She has been working with Dr. Gray and Dr. Lopez.  Extended release Propranolol was started with improvement and she was later switched to short acting Propranolol.  She has seen PT and OT. Today Maria Esther Hendrickson reports she is feeling much better with less palpitations.  She feels like \"it is slowly going out of her body\".    She still gets the anxiety and fears about leaving her home, but it is better.  She will be following up with Dr. Lopez.  She has not made an appointment with Health Psychology.  As an  she is busy with tax season but is able to control her hours. She is integrating into her life the techniques taught by OT and PT.  She has been fasting with Ramadan and find in the last two days she is getting more heart symptoms.    OBJECTIVE:     Vitals:  No vitals were obtained today due to virtual visit.  Patient states they have not experienced any fevers.    Physical Exam   GENERAL: Healthy, alert and no distress  EYES: Eyes grossly normal to inspection.  No discharge or erythema, or obvious scleral/conjunctival abnormalities.  RESP: No audible wheeze, cough, or visible cyanosis.  No visible retractions or increased work of breathing.    SKIN: Visible skin clear. No significant rash, abnormal pigmentation or lesions.  NEURO: Cranial nerves grossly intact.  Mentation and speech appropriate for age.  PSYCH: Mentation appears normal, affect normal/bright, judgement and insight intact, normal speech and appearance well-groomed.      Labs:      I personally reviewed the following lab results today and those on care everywhere, if indicated     No results found for: CRP   No results found for: SED     Lab Results   Component Value Date    WBC 7.7 11/08/2018     Lab Results   Component Value Date    RBC " 4.64 11/08/2018     Lab Results   Component Value Date    HGB 13.0 11/08/2018     Lab Results   Component Value Date    HCT 40.4 11/08/2018     No components found for: MCT  Lab Results   Component Value Date    MCV 87 11/08/2018     Lab Results   Component Value Date    MCH 28.0 11/08/2018     Lab Results   Component Value Date    MCHC 32.2 11/08/2018     Lab Results   Component Value Date    RDW 13.1 11/08/2018     Lab Results   Component Value Date     11/08/2018      No results found for: A1C   TSH   Date Value Ref Range Status   11/08/2018 0.98 0.40 - 4.00 mU/L Final      No results found for: VITDT   Last Comprehensive Metabolic Panel:  Sodium   Date Value Ref Range Status   11/08/2018 138 133 - 144 mmol/L Final     Potassium   Date Value Ref Range Status   11/08/2018 4.3 3.4 - 5.3 mmol/L Final     Chloride   Date Value Ref Range Status   11/08/2018 105 94 - 109 mmol/L Final     Carbon Dioxide   Date Value Ref Range Status   11/08/2018 25 20 - 32 mmol/L Final     Anion Gap   Date Value Ref Range Status   11/08/2018 8 3 - 14 mmol/L Final     Albumin   Date Value Ref Range Status   11/08/2018 4.0 3.4 - 5.0 g/dL Final     Glucose   Date Value Ref Range Status   11/08/2018 84 70 - 99 mg/dL Final     Comment:     Non Fasting     Urea Nitrogen   Date Value Ref Range Status   11/08/2018 20 7 - 30 mg/dL Final     Creatinine   Date Value Ref Range Status   11/08/2018 0.63 0.52 - 1.04 mg/dL Final     GFR Estimate   Date Value Ref Range Status   11/08/2018 >90 >60 mL/min/1.7m2 Final     Comment:     Non  GFR Calc     Calcium   Date Value Ref Range Status   11/08/2018 9.2 8.5 - 10.1 mg/dL Final     Bilirubin Total   Date Value Ref Range Status   11/08/2018 0.2 0.2 - 1.3 mg/dL Final     Alkaline Phosphatase   Date Value Ref Range Status   11/08/2018 67 40 - 150 U/L Final     ALT   Date Value Ref Range Status   11/08/2018 17 0 - 50 U/L Final     AST   Date Value Ref Range Status   11/08/2018 15 0 - 45  U/L Final     No results for input(s): MAG in the last [id number] hours.        No lab results found.    Reviewed labs from [facility] sites and [facility]     Imaging:    I personally reviewed the following imaging results today and those on care everywhere, if indicated     1/5/23 Transthoracic echo-normal except for small stable 4 mm loculated pericardial effusion.    Reviewed imaging from [facility] and [facility]     Medical Records Reviewed:    Reviewed consults/documents from [facility] and [facility] including Primary Care, Cardiology, PT and OT     Impression:  1. Heart racing-much improved-near resolved  2. Dizziness-much improved  3. POTS-improving  4. Post acute sequelae of COVID (Long COVID syndrome)  5. Anxiety-continues     Plan:  1. Questions were answered.  2. Continue to use techniques as taught by therapies (PT/OT)  3. Continue to work with Dr. Sepulveda and Dr. Gray.  4. Referral to Health Psychology-we discussed and she agrees.  She still has anxiety and fears over what has happened..  5. Apps for anxiety management provided.  6. With heart symptoms increased during day fasting we discussed that she will probably need to drink during day as she may be getting dehydrated.  If that is not effective than adding juices to decrease risk of concurrent hypoglycemia.  If this is not effective she may need to not fast this year.  She will see how things go.    7. I will follow up with Maria Esther Hendrickson in 3 months. I will review progress and consider need for any other therapeutic interventions. If there are any questions and/or concerns she will call the clinic.        On day of encounter time spent in chart review and with patient in consultation, exam, education and coordination of care, excluding procedures:  34 minutes     Irma Escalante MD, FAAPMR   Department Rehabilitation Medicine-Wound Fellowship Director  Adjunct  Department   Medicine and Community Health  [facility] [place]

## 2023-03-28 NOTE — NURSING NOTE
Is the patient currently in the state of [place]? YES    Visit mode:VIDEO    If the visit is dropped, the patient can be reconnected by: VIDEO VISIT: Text to cell phone: [phone]    Will anyone else be joining the visit? NO      How would you like to obtain your AVS? MyChart    Are changes needed to the allergy or medication list? NO    Reason for visit: 3 month follow up

## 2023-05-07 ENCOUNTER — HEALTH MAINTENANCE LETTER (OUTPATIENT)
Age: 43
End: 2023-05-07

## 2023-06-29 ENCOUNTER — VIRTUAL VISIT (OUTPATIENT)
Dept: PHYSICAL MEDICINE AND REHAB | Facility: CLINIC | Age: 43
End: 2023-06-29
Payer: COMMERCIAL

## 2023-06-29 DIAGNOSIS — U09.9 LONG COVID: ICD-10-CM

## 2023-06-29 DIAGNOSIS — U09.9 POST-COVID CHRONIC ANXIETY: ICD-10-CM

## 2023-06-29 DIAGNOSIS — R00.0 RACING HEART BEAT: Primary | ICD-10-CM

## 2023-06-29 DIAGNOSIS — F41.9 POST-COVID CHRONIC ANXIETY: ICD-10-CM

## 2023-06-29 DIAGNOSIS — Z74.09 POST-COVID CHRONIC DECREASED MOBILITY AND ENDURANCE: ICD-10-CM

## 2023-06-29 DIAGNOSIS — R42 DIZZINESS: ICD-10-CM

## 2023-06-29 DIAGNOSIS — G90.A POTS (POSTURAL ORTHOSTATIC TACHYCARDIA SYNDROME): ICD-10-CM

## 2023-06-29 DIAGNOSIS — G93.32 POST-COVID CHRONIC FATIGUE: ICD-10-CM

## 2023-06-29 DIAGNOSIS — U09.9 POST-COVID CHRONIC DECREASED MOBILITY AND ENDURANCE: ICD-10-CM

## 2023-06-29 DIAGNOSIS — U09.9 POST-COVID CHRONIC FATIGUE: ICD-10-CM

## 2023-06-29 PROCEDURE — 99215 OFFICE O/P EST HI 40 MIN: CPT | Mod: VID | Performed by: PHYSICAL MEDICINE & REHABILITATION

## 2023-06-29 SDOH — SOCIAL STABILITY: SOCIAL NETWORK: I HAVE TROUBLE DOING ALL OF MY REGULAR LEISURE ACTIVITIES WITH OTHERS: RARELY

## 2023-06-29 SDOH — SOCIAL STABILITY: SOCIAL NETWORK: PROMIS ABILITY TO PARTICIPATE IN SOCIAL ROLES & ACTIVITIES T-SCORE: 46

## 2023-06-29 SDOH — SOCIAL STABILITY: SOCIAL NETWORK: I HAVE TROUBLE DOING ALL OF MY USUAL WORK (INCLUDE WORK AT HOME): SOMETIMES

## 2023-06-29 SDOH — SOCIAL STABILITY: SOCIAL NETWORK

## 2023-06-29 SDOH — SOCIAL STABILITY: SOCIAL NETWORK: I HAVE TROUBLE DOING ALL OF THE ACTIVITIES WITH FRIENDS THAT I WANT TO DO: SOMETIMES

## 2023-06-29 SDOH — SOCIAL STABILITY: SOCIAL NETWORK: I HAVE TROUBLE DOING ALL OF THE FAMILY ACTIVITIES THAT I WANT TO DO: SOMETIMES

## 2023-06-29 ASSESSMENT — ENCOUNTER SYMPTOMS
EYE REDNESS: 0
DECREASED CONCENTRATION: 1
HYPERTENSION: 0
DYSURIA: 0
ORTHOPNEA: 1
EYE PAIN: 1
MEMORY LOSS: 0
JAUNDICE: 0
ABDOMINAL PAIN: 0
DIARRHEA: 0
SKIN CHANGES: 0
SHORTNESS OF BREATH: 1
HEMATURIA: 0
NIGHT SWEATS: 0
COUGH: 1
RECTAL PAIN: 0
LOSS OF CONSCIOUSNESS: 1
DIZZINESS: 1
FLANK PAIN: 0
HEADACHES: 0
LEG PAIN: 1
SEIZURES: 0
VOMITING: 0
POLYPHAGIA: 0
ARTHRALGIAS: 0
DIFFICULTY URINATING: 0
DOUBLE VISION: 1
BLOOD IN STOOL: 0
EYE IRRITATION: 1
NUMBNESS: 0
DYSPNEA ON EXERTION: 1
INSOMNIA: 0
SPUTUM PRODUCTION: 1
STIFFNESS: 0
POSTURAL DYSPNEA: 0
WEAKNESS: 1
DISTURBANCES IN COORDINATION: 1
INCREASED ENERGY: 1
SNORES LOUDLY: 0
JOINT SWELLING: 0
NAUSEA: 1
MUSCLE WEAKNESS: 1
CONSTIPATION: 1
SLEEP DISTURBANCES DUE TO BREATHING: 1
MUSCLE CRAMPS: 1
BOWEL INCONTINENCE: 0
NERVOUS/ANXIOUS: 1
POLYDIPSIA: 0
BLOATING: 1
HYPOTENSION: 1
BACK PAIN: 0
SPEECH CHANGE: 0
NECK PAIN: 0
PARALYSIS: 0
COUGH DISTURBING SLEEP: 0
TINGLING: 0
PALPITATIONS: 1
LIGHT-HEADEDNESS: 1
MYALGIAS: 1
POOR WOUND HEALING: 0
SYNCOPE: 0
WEIGHT LOSS: 0
CHILLS: 0
TREMORS: 0
FEVER: 0
WEIGHT GAIN: 1
DECREASED APPETITE: 0
DEPRESSION: 0
PANIC: 1
WHEEZING: 0
ALTERED TEMPERATURE REGULATION: 1
EYE WATERING: 0
HALLUCINATIONS: 0
FATIGUE: 1
HEARTBURN: 0
EXERCISE INTOLERANCE: 0
HEMOPTYSIS: 0

## 2023-06-29 ASSESSMENT — ANXIETY QUESTIONNAIRES
7. FEELING AFRAID AS IF SOMETHING AWFUL MIGHT HAPPEN: NEARLY EVERY DAY
1. FEELING NERVOUS, ANXIOUS, OR ON EDGE: SEVERAL DAYS
IF YOU CHECKED OFF ANY PROBLEMS ON THIS QUESTIONNAIRE, HOW DIFFICULT HAVE THESE PROBLEMS MADE IT FOR YOU TO DO YOUR WORK, TAKE CARE OF THINGS AT HOME, OR GET ALONG WITH OTHER PEOPLE: SOMEWHAT DIFFICULT
8. IF YOU CHECKED OFF ANY PROBLEMS, HOW DIFFICULT HAVE THESE MADE IT FOR YOU TO DO YOUR WORK, TAKE CARE OF THINGS AT HOME, OR GET ALONG WITH OTHER PEOPLE?: SOMEWHAT DIFFICULT
GAD7 TOTAL SCORE: 15
2. NOT BEING ABLE TO STOP OR CONTROL WORRYING: NEARLY EVERY DAY
6. BECOMING EASILY ANNOYED OR IRRITABLE: MORE THAN HALF THE DAYS
3. WORRYING TOO MUCH ABOUT DIFFERENT THINGS: MORE THAN HALF THE DAYS
5. BEING SO RESTLESS THAT IT IS HARD TO SIT STILL: MORE THAN HALF THE DAYS
7. FEELING AFRAID AS IF SOMETHING AWFUL MIGHT HAPPEN: NEARLY EVERY DAY
GAD7 TOTAL SCORE: 15
4. TROUBLE RELAXING: MORE THAN HALF THE DAYS
GAD7 TOTAL SCORE: 15

## 2023-06-29 ASSESSMENT — PATIENT HEALTH QUESTIONNAIRE - PHQ9
SUM OF ALL RESPONSES TO PHQ QUESTIONS 1-9: 10
SUM OF ALL RESPONSES TO PHQ QUESTIONS 1-9: 10

## 2023-06-29 NOTE — PATIENT INSTRUCTIONS
Applications for Sleep and Anxiety:    Phone Apps:    Calm  Insight Timer       Plan and Recommendations:     Continue to use techniques as taught by therapies (PT/OT)  Will ask Dr. Hilario to see for cardiac symptoms and POTS.  Referral to Health Psychology and Psychiatry written-we discussed how important this is and she agrees.    Apps for anxiety management provided. She needs to start using these.  Bivalent COVID booster vaccine recommended.  Risks and benefits reviewed.  Recommend use of compression stocking daily 20-30 mmHg, knee or thigh high, open or closed toe.    She promises she will not hurt herself but if things worsen she will go to [facility] am asking nursing to help her get into Endocrinology and Behavioral Health quickly.  Maria Esther Hendrickson will follow up in the post COVID clinic in 6 weeks with Dr. Yanna Saldaña.  If there are any questions and/or concerns she was instructed to call the clinic.

## 2023-06-29 NOTE — PROGRESS NOTES
Video-Visit Details    Type of service:  Video Visit    Originating Location (pt. Location): Home    Distant Location (provider location):  Off-site    Platform used for Video Visit: Radha     PHQ Assesment Total Score(s) 3/27/2023   PHQ-9 Score 0   Some recent data might be hidden      JESSICA-7 Results 3/27/2023   JESSICA 7 TOTAL SCORE 0 (minimal anxiety)   Some recent data might be hidden      PTSD Screen Score 3/27/2023   Have you ever experienced this kind of event? No   Some recent data might be hidden      PROMIS-29 3/27/2023   PROMIS Physical Function T-Score 41.8 (mild dysfunction)   PROMIS Anxiety T-Score 55.8   PROMIS Depression T-Score 41 (within normal limits)   PROMIS Fatigue T-Score 46 (within normal limits)   PROMIS Sleep Disturbance T-Score 50.5 (within normal limits)   PROMIS Ability to Participate in Social Roles & Activities T-Score 64.1 (within normal limits)   PROMIS Pain Interference T-Score 41.6 (within normal limits)   PROMIS Pain Intensity 0           6/29/2023     4:21 PM   PHQ Assesment Total Score(s)   PHQ-9 Score 10         6/29/2023     4:22 PM   JESSICA-7 Results   JESSICA 7 TOTAL SCORE 15 (severe anxiety)   JESSICA-7 Total Score 15         6/29/2023     4:22 PM   PTSD Screen Score   Have you ever experienced this kind of event? No         6/29/2023     4:32 PM   PROMIS-29   PROMIS Physical Function T-Score 45 (within normal limits)   PROMIS Anxiety T-Score 63 (moderate)   PROMIS Depression T-Score 57 (mild)   PROMIS Fatigue T-Score 63 (moderate)   PROMIS Sleep Disturbance T-Score 44 (within normal limits)   PROMIS Ability to Participate in Social Roles & Activities T-Score 46 (within normal limits)   PROMIS Pain Interference T-Score 61 (moderate)   PROMIS Pain Intensity 3     Questionnaire scores significantly worsened.      Past Medical History:    Past Medical History:   Diagnosis Date     POTS (postural orthostatic tachycardia syndrome)         Surgical History:   Past Surgical History:    Procedure Laterality Date     APPENDECTOMY  06/2014     AS HYSTEROSCOPY W ENDOMETRIAL BX/POLYPECTOMY W/WO D&C N/A 03/2018        Medications:    Current Outpatient Medications   Medication     acetaminophen (TYLENOL) 500 MG tablet     aspirin (ASA) 81 MG EC tablet     ibuprofen (ADVIL/MOTRIN) 800 MG tablet     meloxicam (MOBIC) 15 MG tablet     propranolol ER (INDERAL LA) 60 MG 24 hr capsule     No current facility-administered medications for this visit.        Allergies:    Allergies   Allergen Reactions     Contrast Dye      No Clinical Screening - See Comments Other (See Comments)     LW Other1: -seasonal     Diatrizoate Itching     Medicated with 50mg IV Benadryl on 2- and pt sneezed three times after injection but said it was much better then last time.  Jessica Pepper ....................  2/27/2015   4:02 PM        Family history: History reviewed. No pertinent family history.     Social History:   Social History     Tobacco Use     Smoking status: Never     Smokeless tobacco: Never   Vaping Use     Vaping Use: Never used   Substance Use Topics     Alcohol use: No     Drug use: No          SUBJECTIVE:     Initial: 12/27/22     This 42 year old female presents to the [facility] Rehabilitation Medicine Post-COVID clinic as a new consult to evaluate continuing symptoms after COVID infection initially diagnosed 8/16/22.  She experienced body aches, fever to 104 with chills.  She did better after about a week.  She then got recurrent symptoms similar as before but now with cough. She was treated OTC and steroids which helped with the cough. She was doing well until 8/12/22 and was working as an .   She then started to feel her heart racing very high when just sitting.  Her hands and face got numb and she felt faint.  She called 911.  She was short of breath.  She was taken to ED.  She went to [facility].  Her labs were normal.  Her BP was high and her heart rate was high.  She was short  "of breath and was admitted.  She was started on Metoprolol. She was discharged on the .  She followed with Cardiology at [facility].  Cardiac MRI was reviewed. It was felt she had pericarditis. Colchicine was started with some benefit.   The Metoprolol continued to give her shortness of breath and \"not feeling good\".  She was seen again by Cardiology and diagnosed with pericardial effusion. She was then was seen by [facility] and was told she may have POTS.   She eventually saw Dr. Rocha on 22 who also felt she had POTS.  She was instructed to start ivabrabine (which she is not taking), wean Metoprolol, avoid caffeine and f/u 6 weeks.  Continuing symptoms include palpitations, dizziness, heart racing, distractibility, decreased/abnormal taste and anxiety.   She feels her life has changed greatly and she is very very nervous her heart rate will increase and she will get short of breath.  She gets nervous.  She was recently changed to Propranolol which seems to be helping. When her heart starts to race her BP goes to 170 over 100.  She has felt most comfortable with Dr. Sepulveda ([facility]) and is scheduled to see him 23.  She is to see Dr. Marina Suazo cardiologist on 23.  Past medical history is significant for some panic attacks.  The patient had the J&J vaccine, then the Pfizer booster. She was previously working full time and has now cut down to about 12 hours a week. Maria Esther Hendrickson feels she is functioning at 50 % of pre COVID level.  Her father  of cancer last year followed by her mother passing from COVID.     Most recent visit: 3/28/23     Maria Esther Hendrickson returns to the [facility] Rehabilitation Medicine Post-COVID clinic for follow up of continuing symptoms after COVID infection.  History is as noted above.  Most recent treatment plan ordered was post COVID programs PT/OT, referral to Health Psychology, continue follow up with Dr. John Mora and to see " "Dr. Gray Electrocardiology and to self monitor BP and P.  She has been working with Dr. Gray and Dr. Lopez.  Extended release Propranolol was started with improvement and she was later switched to short acting Propranolol.  She has seen PT and OT. Today Maria Esther Hendrickson reports she is feeling much better with less palpitations.  She feels like \"it is slowly going out of her body\".    She still gets the anxiety and fears about leaving her home, but it is better.  She will be following up with Dr. Lopez.  She has not made an appointment with Health Psychology.  As an  she is busy with tax season but is able to control her hours. She is integrating into her life the techniques taught by OT and PT.  She has been fasting with Ramadan and find in the last two days she is getting more heart symptoms.    Update: 6/29/23    Maria Esther Hendrickson returns to the [facility] Rehabilitation Medicine Post-COVID clinic for follow up of continuing symptoms after COVID infection.  History is as noted above.  Most recent treatment plan ordered was to continue to use techniques as instructed by therapies, continuing work with Dr. Lopez Cardiology and Dr. Gray Cardiac Electrophysiology, referral to Health Psychology and use of apps for anxiety management.  Her last COVID vaccine was monovalent 11/30/21.  Today Maria Esther Hendrickson reports she tried weaning the Propanolol but found she had to go back on it.  She feels more coldness, shortness of breath, shaking cold, nausea, imbalance, dizziness.  She tries the Propanolol which helps slightly. Dr. Lopez said she could follow up as needed.  Cardiac echo was normal. She is frustrated and scared.  When she gets these symptoms she experiences weakness in her legs.      OBJECTIVE:     Vitals:  No vitals were obtained today due to virtual visit.  Patient states they have not experienced any fevers.    Physical Exam   GENERAL: Healthy, alert but anxious and tearful.  EYES: Eyes grossly normal " to inspection.  No discharge or erythema, or obvious scleral/conjunctival abnormalities.  RESP: No audible wheeze, cough, or visible cyanosis.  No visible retractions or increased work of breathing.    SKIN: Visible skin clear. No significant rash, abnormal pigmentation or lesions.  NEURO: Cranial nerves grossly intact.  Mentation and speech appropriate for age.  PSYCH: Mentation appears normal, affect anxious, judgement and insight intact, normal speech and appearance well-groomed.      Labs:      I personally reviewed the following lab results today and those on care everywhere, if indicated     No results found for: CRP   No results found for: SED     Lab Results   Component Value Date    WBC 7.7 11/08/2018     Lab Results   Component Value Date    RBC 4.64 11/08/2018     Lab Results   Component Value Date    HGB 13.0 11/08/2018     Lab Results   Component Value Date    HCT 40.4 11/08/2018     No components found for: MCT  Lab Results   Component Value Date    MCV 87 11/08/2018     Lab Results   Component Value Date    MCH 28.0 11/08/2018     Lab Results   Component Value Date    MCHC 32.2 11/08/2018     Lab Results   Component Value Date    RDW 13.1 11/08/2018     Lab Results   Component Value Date     11/08/2018      No results found for: A1C   TSH   Date Value Ref Range Status   11/08/2018 0.98 0.40 - 4.00 mU/L Final      No results found for: VITDT   Last Comprehensive Metabolic Panel:  Sodium   Date Value Ref Range Status   11/08/2018 138 133 - 144 mmol/L Final     Potassium   Date Value Ref Range Status   11/08/2018 4.3 3.4 - 5.3 mmol/L Final     Chloride   Date Value Ref Range Status   11/08/2018 105 94 - 109 mmol/L Final     Carbon Dioxide   Date Value Ref Range Status   11/08/2018 25 20 - 32 mmol/L Final     Anion Gap   Date Value Ref Range Status   11/08/2018 8 3 - 14 mmol/L Final     Albumin   Date Value Ref Range Status   11/08/2018 4.0 3.4 - 5.0 g/dL Final     Glucose   Date Value Ref Range  Status   11/08/2018 84 70 - 99 mg/dL Final     Comment:     Non Fasting     Urea Nitrogen   Date Value Ref Range Status   11/08/2018 20 7 - 30 mg/dL Final     Creatinine   Date Value Ref Range Status   11/08/2018 0.63 0.52 - 1.04 mg/dL Final     GFR Estimate   Date Value Ref Range Status   11/08/2018 >90 >60 mL/min/1.7m2 Final     Comment:     Non  GFR Calc     Calcium   Date Value Ref Range Status   11/08/2018 9.2 8.5 - 10.1 mg/dL Final     Bilirubin Total   Date Value Ref Range Status   11/08/2018 0.2 0.2 - 1.3 mg/dL Final     Alkaline Phosphatase   Date Value Ref Range Status   11/08/2018 67 40 - 150 U/L Final     ALT   Date Value Ref Range Status   11/08/2018 17 0 - 50 U/L Final     AST   Date Value Ref Range Status   11/08/2018 15 0 - 45 U/L Final     No results for input(s): MAG in the last [id number] hours.        BASIC METABOLIC PANEL  Order: [id number]   Ref Range & Units 1 mo ago   SODIUM 135 - 145 mmol/L 140    POTASSIUM 3.5 - 5.0 mmol/L 3.8    CHLORIDE 98 - 110 mmol/L 109    CO2,TOTAL 21 - 31 mmol/L 23    ANION GAP 5 - 18 8    GLUCOSE 70 - 99 mg/dL 106 High     CALCIUM 8.5 - 10.5 mg/dL 9.5    BUN 8 - 25 mg/dL 18    CREATININE 0.57 - 1.11 mg/dL 0.82    BUN/CREAT RATIO           10 - 20 22 High     eGFR >90 mL/min/1.73m2 90 Low       CBC WITH AUTO DIFFERENTIAL  Order: [id number]   Ref Range & Units 1 mo ago   WHITE BLOOD COUNT         4.5 - 11.0 thou/cu mm 8.6    RED BLOOD COUNT           4.00 - 5.20 mil/cu mm 4.71    HEMOGLOBIN               12.0 - 16.0 g/dL 13.5    HEMATOCRIT               33.0 - 51.0 % 41.3    MCV                      80 - 100 fL 88    MCH                      26.0 - 34.0 pg 28.7    MCHC                     32.0 - 36.0 g/dL 32.7    RDW                      11.5 - 15.5 % 12.4    PLATELET COUNT            140 - 440 thou/cu mm 280    MPV                      6.5 - 11.0 fL 10.3    NRBC                     % 0.0    ABS NRBC thou /cu mm 0.0    % NEUT % 58.6    % LYMPH % 28.8    %  MONO % 7.8    % EOS % 3.9    % BASO % 0.7    % IMMATURE GRAN (METAS,MYELOS,PROS) % 0.2    ABSOLUTE NEUTROPHILS      1.7 - 7.0 thou/cu mm 5.0    ABSOLUTE LYMPHOCYTES      0.9 - 2.9 thou/cu mm 2.5    ABSOLUTE MONOCYTES        <0.9 thou/cu mm 0.7    ABSOLUTE EOSINOPHILS      <0.5 thou/cu mm 0.3    ABSOLUTE BASOPHILS        <0.3 thou/cu mm 0.1    ABSOLUTE IMMATURE GRANULOCYTES(METAS,MYELOS,PROS) <0.3 thou/cu mm 0.0    Resulting [facility] LABORATORY     Specimen Collected: 05/17/23  6:20 PM    Performed by: [facility] Last Resulted: 05/17/23  6:31 PM   Received From: [facility]  Result Received: 06/29/23  4:32 PM     Ref Range & Units 1 mo ago    MAGNESIUM 1.6 - 2.6 mg/dL 2.0    Resulting [facility]     Specimen Collected: 05/17/23  6:20 PM    Performed by: [facility] LAB[facility] Last Resulted: 05/17/23  6:59 PM   Received From: [facility]  Result Received: 06/29/23  4:32 PM     Ref Range & Units3 wk ago COVID-19 Virus by PCR (External Result)NegativeNegative Comment: All PCR tests are subject to false negative result due to variability in viral load and collection technique. A negative result does not rule out a SARS-CoV-2 infection. Clinical correlation required. [facility] Comment: Specimen submitted to [facility] for testing.    Ref Range & Units 1 mo ago   TROPONIN T HS 6-10 ng/L ng/L ng/L <6    Resulting [facility]      Ref Range & Units 1 mo ago   TSH 0.27 - 4.20 uIU/mL 1.06    Resulting [facility] LABORAT[facility]   Narrative  Performed by [facility]    Reviewed labs from [facility] and [facility]     Imaging:    I personally reviewed the following imaging results today and those on care everywhere, if indicated     Nothing new to review    Reviewed imaging from [facility]  and [facility]     Medical Records Reviewed:    Reviewed consults/documents from [facility] and [facility] including Ob/Gyn, Primary Care and Cardiology     Impression:  1. Heart racing-much improved-near resolved  2. Dizziness-much improved  3. POTS-improving  4. Post acute sequelae of COVID (Long COVID syndrome)  5. Anxiety-continues     Plan:  1. Questions were answered.  2. Continue to use techniques as taught by therapies (PT/OT)  3. Will ask Dr. Hilario to see for cardiac symptoms and POTS.  4. Referral to Health Psychology and Psychiatry written-we discussed how important this is and she agrees.    5. Apps for anxiety management provided. She needs to start using these.  6. Bivalent COVID booster vaccine recommended.  Risks and benefits reviewed.  7. Recommend use of compression stocking daily 20-30 mmHg, knee or thigh high, open or closed toe.    8. She promises she will not hurt herself but if things worsen she will go to [facility] am asking nursing to help her get into Endocrinology and Behavioral Health quickly.  9. Maria Esther F Madhavi will follow up in the post COVID clinic in 6 weeks with Dr. Yanna Saldaña.  If there are any questions and/or concerns she was instructed to call the clinic.      On day of encounter time spent in chart review and with patient in consultation, exam, education and coordination of care, excluding procedures:  45 minutes     Irma Escalante MD, FAAPMR   Department Rehabilitation Medicine-Wound Fellowship Director  Adjunct  Department [facility]            Answers for HPI/ROS submitted by the patient on 6/29/2023  PHQ9 TOTAL SCORE: 10  JESSICA 7 TOTAL SCORE: 15

## 2023-06-29 NOTE — NURSING NOTE
Is the patient currently in the state of [place]? YES    Visit mode:VIDEO    If the visit is dropped, the patient can be reconnected by: VIDEO VISIT: Send to e-mail at: [email or web address]    Will anyone else be joining the visit? NO      How would you like to obtain your AVS? MyChart    Are changes needed to the allergy or medication list? NO    Reason for visit: RECHECK    Pt reported fatigue and dizziness.    Isaura Severino, VF

## 2023-06-29 NOTE — LETTER
6/29/2023       RE: Maria Esther Hendrickson  [address] [place] [address]       Dear Colleague,    Thank you for referring your patient, Maria Esther Hendrickson, to the [facility] [place] [facility]. Please see a copy of my visit note below.      PHQ Assesment Total Score(s) 3/27/2023   PHQ-9 Score 0   Some recent data might be hidden      JESSICA-7 Results 3/27/2023   JESSICA 7 TOTAL SCORE 0 (minimal anxiety)   Some recent data might be hidden      PTSD Screen Score 3/27/2023   Have you ever experienced this kind of event? No   Some recent data might be hidden      PROMIS-29 3/27/2023   PROMIS Physical Function T-Score 41.8 (mild dysfunction)   PROMIS Anxiety T-Score 55.8   PROMIS Depression T-Score 41 (within normal limits)   PROMIS Fatigue T-Score 46 (within normal limits)   PROMIS Sleep Disturbance T-Score 50.5 (within normal limits)   PROMIS Ability to Participate in Social Roles & Activities T-Score 64.1 (within normal limits)   PROMIS Pain Interference T-Score 41.6 (within normal limits)   PROMIS Pain Intensity 0           6/29/2023     4:21 PM   PHQ Assesment Total Score(s)   PHQ-9 Score 10         6/29/2023     4:22 PM   JESSICA-7 Results   JESSICA 7 TOTAL SCORE 15 (severe anxiety)   JESSICA-7 Total Score 15         6/29/2023     4:22 PM   PTSD Screen Score   Have you ever experienced this kind of event? No         6/29/2023     4:32 PM   PROMIS-29   PROMIS Physical Function T-Score 45 (within normal limits)   PROMIS Anxiety T-Score 63 (moderate)   PROMIS Depression T-Score 57 (mild)   PROMIS Fatigue T-Score 63 (moderate)   PROMIS Sleep Disturbance T-Score 44 (within normal limits)   PROMIS Ability to Participate in Social Roles & Activities T-Score 46 (within normal limits)   PROMIS Pain Interference T-Score 61 (moderate)   PROMIS Pain Intensity 3     Questionnaire scores significantly worsened.      Past Medical History:    Past  Medical History:   Diagnosis Date    POTS (postural orthostatic tachycardia syndrome)         Surgical History:   Past Surgical History:   Procedure Laterality Date    APPENDECTOMY  06/2014    AS HYSTEROSCOPY W ENDOMETRIAL BX/POLYPECTOMY W/WO D&C N/A 03/2018        Medications:    Current Outpatient Medications   Medication    acetaminophen (TYLENOL) 500 MG tablet    aspirin (ASA) 81 MG EC tablet    ibuprofen (ADVIL/MOTRIN) 800 MG tablet    meloxicam (MOBIC) 15 MG tablet    propranolol ER (INDERAL LA) 60 MG 24 hr capsule     No current facility-administered medications for this visit.        Allergies:    Allergies   Allergen Reactions    Contrast Dye     No Clinical Screening - See Comments Other (See Comments)     LW Other1: -seasonal    Diatrizoate Itching     Medicated with 50mg IV Benadryl on 2- and pt sneezed three times after injection but said it was much better then last time.  Jessica Pepper ....................  2/27/2015   4:02 PM        Family history: History reviewed. No pertinent family history.     Social History:   Social History     Tobacco Use    Smoking status: Never    Smokeless tobacco: Never   Vaping Use    Vaping Use: Never used   Substance Use Topics    Alcohol use: No    Drug use: No          SUBJECTIVE:     Initial: 12/27/22     This 42 year old female presents to the [facility] Post-COVID clinic as a new consult to evaluate continuing symptoms after COVID infection initially diagnosed 8/16/22.  She experienced body aches, fever to 104 with chills.  She did better after about a week.  She then got recurrent symptoms similar as before but now with cough. She was treated OTC and steroids which helped with the cough. She was doing well until 8/12/22 and was working as an .   She then started to feel her heart racing very high when just sitting.  Her hands and face got numb and she felt faint.  She called 911.  She was short of breath.   "She was taken to ED.  She went to [facility].  Her labs were normal.  Her BP was high and her heart rate was high.  She was short of breath and was admitted.  She was started on Metoprolol. She was discharged on the .  She followed with Cardiology at [facility].  Cardiac MRI was reviewed. It was felt she had pericarditis. Colchicine was started with some benefit.   The Metoprolol continued to give her shortness of breath and \"not feeling good\".  She was seen again by Cardiology and diagnosed with pericardial effusion. She was then was seen by [facility] and was told she may have POTS.   She eventually saw Dr. Rocha on 22 who also felt she had POTS.  She was instructed to start ivabrabine (which she is not taking), wean Metoprolol, avoid caffeine and f/u 6 weeks.  Continuing symptoms include palpitations, dizziness, heart racing, distractibility, decreased/abnormal taste and anxiety.   She feels her life has changed greatly and she is very very nervous her heart rate will increase and she will get short of breath.  She gets nervous.  She was recently changed to Propranolol which seems to be helping. When her heart starts to race her BP goes to 170 over 100.  She has felt most comfortable with Dr. Sepulveda ([facility]) and is scheduled to see him 23.  She is to see Dr. Marina Suazo cardiologist on 23.  Past medical history is significant for some panic attacks.  The patient had the J&J vaccine, then the Pfizer booster. She was previously working full time and has now cut down to about 12 hours a week. Maria Esther Hendrickson feels she is functioning at 50 % of pre COVID level.  Her father  of cancer last year followed by her mother passing from COVID.     Most recent visit: 3/28/23     Maria Esther Hendrickson returns to the [facility] Post-COVID clinic for follow up of continuing symptoms after COVID infection.  History is as noted above.  Most recent treatment plan ordered " "was post COVID programs PT/OT, referral to Health Psychology, continue follow up with Dr. Lopez Cardiology [place] and to see Dr. Gray Electrocardiology and to self monitor BP and P.  She has been working with Dr. Gray and Dr. Lopez.  Extended release Propranolol was started with improvement and she was later switched to short acting Propranolol.  She has seen PT and OT. Today Maria Esther Hendrickson reports she is feeling much better with less palpitations.  She feels like \"it is slowly going out of her body\".    She still gets the anxiety and fears about leaving her home, but it is better.  She will be following up with Dr. Lopez.  She has not made an appointment with Health Psychology.  As an  she is busy with tax season but is able to control her hours. She is integrating into her life the techniques taught by OT and PT.  She has been fasting with Ramadan and find in the last two days she is getting more heart symptoms.    Update: 6/29/23    Maria Esther Hendrickson returns to the [facility] Medicine Post-COVID clinic for follow up of continuing symptoms after COVID infection.  History is as noted above.  Most recent treatment plan ordered was to continue to use techniques as instructed by therapies, continuing work with Dr. Lopez Cardiology and Dr. Gray Cardiac Electrophysiology, referral to Health Psychology and use of apps for anxiety management.  Her last COVID vaccine was monovalent 11/30/21.  Today Maria Esther Hendrickson reports she tried weaning the Propanolol but found she had to go back on it.  She feels more coldness, shortness of breath, shaking cold, nausea, imbalance, dizziness.  She tries the Propanolol which helps slightly. Dr. Lopez said she could follow up as needed.  Cardiac echo was normal. She is frustrated and scared.  When she gets these symptoms she experiences weakness in her legs.      OBJECTIVE:     Vitals:  No vitals were obtained today due to virtual visit.  Patient states they have " not experienced any fevers.    Physical Exam   GENERAL: Healthy, alert but anxious and tearful.  EYES: Eyes grossly normal to inspection.  No discharge or erythema, or obvious scleral/conjunctival abnormalities.  RESP: No audible wheeze, cough, or visible cyanosis.  No visible retractions or increased work of breathing.    SKIN: Visible skin clear. No significant rash, abnormal pigmentation or lesions.  NEURO: Cranial nerves grossly intact.  Mentation and speech appropriate for age.  PSYCH: Mentation appears normal, affect anxious, judgement and insight intact, normal speech and appearance well-groomed.      Labs:      I personally reviewed the following lab results today and those on care everywhere, if indicated     No results found for: CRP   No results found for: SED     Lab Results   Component Value Date    WBC 7.7 11/08/2018     Lab Results   Component Value Date    RBC 4.64 11/08/2018     Lab Results   Component Value Date    HGB 13.0 11/08/2018     Lab Results   Component Value Date    HCT 40.4 11/08/2018     No components found for: MCT  Lab Results   Component Value Date    MCV 87 11/08/2018     Lab Results   Component Value Date    MCH 28.0 11/08/2018     Lab Results   Component Value Date    MCHC 32.2 11/08/2018     Lab Results   Component Value Date    RDW 13.1 11/08/2018     Lab Results   Component Value Date     11/08/2018      No results found for: A1C   TSH   Date Value Ref Range Status   11/08/2018 0.98 0.40 - 4.00 mU/L Final      No results found for: VITDT   Last Comprehensive Metabolic Panel:  Sodium   Date Value Ref Range Status   11/08/2018 138 133 - 144 mmol/L Final     Potassium   Date Value Ref Range Status   11/08/2018 4.3 3.4 - 5.3 mmol/L Final     Chloride   Date Value Ref Range Status   11/08/2018 105 94 - 109 mmol/L Final     Carbon Dioxide   Date Value Ref Range Status   11/08/2018 25 20 - 32 mmol/L Final     Anion Gap   Date Value Ref Range Status   11/08/2018 8 3 - 14 mmol/L  Final     Albumin   Date Value Ref Range Status   11/08/2018 4.0 3.4 - 5.0 g/dL Final     Glucose   Date Value Ref Range Status   11/08/2018 84 70 - 99 mg/dL Final     Comment:     Non Fasting     Urea Nitrogen   Date Value Ref Range Status   11/08/2018 20 7 - 30 mg/dL Final     Creatinine   Date Value Ref Range Status   11/08/2018 0.63 0.52 - 1.04 mg/dL Final     GFR Estimate   Date Value Ref Range Status   11/08/2018 >90 >60 mL/min/1.7m2 Final     Comment:     Non  GFR Calc     Calcium   Date Value Ref Range Status   11/08/2018 9.2 8.5 - 10.1 mg/dL Final     Bilirubin Total   Date Value Ref Range Status   11/08/2018 0.2 0.2 - 1.3 mg/dL Final     Alkaline Phosphatase   Date Value Ref Range Status   11/08/2018 67 40 - 150 U/L Final     ALT   Date Value Ref Range Status   11/08/2018 17 0 - 50 U/L Final     AST   Date Value Ref Range Status   11/08/2018 15 0 - 45 U/L Final     No results for input(s): MAG in the last 31633 hours.        BASIC METABOLIC PANEL  Order: [id number]   Ref Range & Units 1 mo ago   SODIUM 135 - 145 mmol/L 140    POTASSIUM 3.5 - 5.0 mmol/L 3.8    CHLORIDE 98 - 110 mmol/L 109    CO2,TOTAL 21 - 31 mmol/L 23    ANION GAP 5 - 18 8    GLUCOSE 70 - 99 mg/dL 106 High     CALCIUM 8.5 - 10.5 mg/dL 9.5    BUN 8 - 25 mg/dL 18    CREATININE 0.57 - 1.11 mg/dL 0.82    BUN/CREAT RATIO           10 - 20 22 High     eGFR >90 mL/min/1.73m2 90 Low       CBC WITH AUTO DIFFERENTIAL  Order: [id number]   Ref Range & Units 1 mo ago   WHITE BLOOD COUNT         4.5 - 11.0 thou/cu mm 8.6    RED BLOOD COUNT           4.00 - 5.20 mil/cu mm 4.71    HEMOGLOBIN               12.0 - 16.0 g/dL 13.5    HEMATOCRIT               33.0 - 51.0 % 41.3    MCV                      80 - 100 fL 88    MCH                      26.0 - 34.0 pg 28.7    MCHC                     32.0 - 36.0 g/dL 32.7    RDW                      11.5 - 15.5 % 12.4    PLATELET COUNT            140 - 440 thou/cu mm 280    MPV                      6.5  - 11.0 fL 10.3    NRBC                     % 0.0    ABS NRBC thou /cu mm 0.0    % NEUT % 58.6    % LYMPH % 28.8    % MONO % 7.8    % EOS % 3.9    % BASO % 0.7    % IMMATURE GRAN (METAS,MYELOS,PROS) % 0.2    ABSOLUTE NEUTROPHILS      1.7 - 7.0 thou/cu mm 5.0    ABSOLUTE LYMPHOCYTES      0.9 - 2.9 thou/cu mm 2.5    ABSOLUTE MONOCYTES        <0.9 thou/cu mm 0.7    ABSOLUTE EOSINOPHILS      <0.5 thou/cu mm 0.3    ABSOLUTE BASOPHILS        <0.3 thou/cu mm 0.1    ABSOLUTE IMMATURE GRANULOCYTES(METAS,MYELOS,PROS) <0.3 thou/cu mm 0.0    Resulting [facility]     Specimen Collected: 05/17/23  6:20 PM    Performed by: [facility] LABORAT[facility] Last Resulted: 05/17/23  6:31 PM   Received From: [facility]  Result Received: 06/29/23  4:32 PM     Ref Range & Units 1 mo ago    MAGNESIUM 1.6 - 2.6 mg/dL 2.0    Resulting [facility]     Specimen Collected: 05/17/23  6:20 PM    Performed by: [facility] LABORAT[facility] Last Resulted: 05/17/23  6:59 PM   Received From: [facility]  Result Received: 06/29/23  4:32 PM     Ref Range & Units3 wk ago COVID-19 Virus by PCR (External Result)NegativeNegative Comment: All PCR tests are subject to false negative result due to variability in viral load and collection technique. A negative result does not rule out a SARS-CoV-2 infection. Clinical correlation required. [facility] Comment: Specimen submitted to [facility] for testing.    Ref Range & Units 1 mo ago   TROPONIN T HS 6-10 ng/L ng/L ng/L <6    Resulting [facility]      Ref Range & Units 1 mo ago   TSH 0.27 - 4.20 uIU/mL 1.06    Resulting [facility]   Narrative  Performed by [facility]    Reviewed labs from [facility] sites and [facility]     Imaging:    I personally reviewed the following imaging results today and  those on care everywhere, if indicated     Nothing new to review    Reviewed imaging from [facility] sites and [facility]     Medical Records Reviewed:    Reviewed consults/documents from [facility] and [facility] including Ob/Gyn, Primary Care and Cardiology     Impression:  Heart racing-much improved-near resolved  Dizziness-much improved  POTS-improving  Post acute sequelae of COVID (Long COVID syndrome)  Anxiety-continues     Plan:  Questions were answered.  Continue to use techniques as taught by therapies (PT/OT)  Will ask Dr. Hilario to see for cardiac symptoms and POTS.  Referral to Health Psychology and Psychiatry written-we discussed how important this is and she agrees.    Apps for anxiety management provided. She needs to start using these.  Bivalent COVID booster vaccine recommended.  Risks and benefits reviewed.  Recommend use of compression stocking daily 20-30 mmHg, knee or thigh high, open or closed toe.    She promises she will not hurt herself but if things worsen she will go to [facility] am asking nursing to help her get into Endocrinology and Behavioral Health quickly.  Maria Esther Hendrickson will follow up in the post COVID clinic in 6 weeks with Dr. Yanna Saldaña.  If there are any questions and/or concerns she was instructed to call the clinic.      On day of encounter time spent in chart review and with patient in consultation, exam, education and coordination of care, excluding procedures:  45 minutes         Answers for HPI/ROS submitted by the patient on 6/29/2023  PHQ9 TOTAL SCORE: 10  JESSICA 7 TOTAL SCORE: 15          Again, thank you for allowing me to participate in the care of your patient.      Sincerely,    Irma Escalante MD

## 2023-07-21 ENCOUNTER — VIRTUAL VISIT (OUTPATIENT)
Dept: ENDOCRINOLOGY | Facility: CLINIC | Age: 43
End: 2023-07-21
Payer: COMMERCIAL

## 2023-07-21 ENCOUNTER — TELEPHONE (OUTPATIENT)
Dept: ENDOCRINOLOGY | Facility: CLINIC | Age: 43
End: 2023-07-21
Payer: COMMERCIAL

## 2023-07-21 DIAGNOSIS — U09.9 LONG COVID: Primary | ICD-10-CM

## 2023-07-21 PROCEDURE — 99204 OFFICE O/P NEW MOD 45 MIN: CPT | Mod: VID | Performed by: INTERNAL MEDICINE

## 2023-07-21 ASSESSMENT — ENCOUNTER SYMPTOMS
POSTURAL DYSPNEA: 1
STIFFNESS: 1
SKIN CHANGES: 0
DIZZINESS: 1
SYNCOPE: 0
PARALYSIS: 0
HYPOTENSION: 1
POOR WOUND HEALING: 0
NECK PAIN: 0
DOUBLE VISION: 1
HEADACHES: 0
SEIZURES: 0
ALTERED TEMPERATURE REGULATION: 1
PALPITATIONS: 1
DYSURIA: 0
LIGHT-HEADEDNESS: 1
INSOMNIA: 0
MEMORY LOSS: 0
LEG PAIN: 0
MUSCLE WEAKNESS: 1
HEMOPTYSIS: 0
HALLUCINATIONS: 0
MYALGIAS: 1
NAIL CHANGES: 0
PANIC: 0
EYE WATERING: 0
NIGHT SWEATS: 0
FATIGUE: 1
TINGLING: 0
COUGH DISTURBING SLEEP: 0
DIFFICULTY URINATING: 0
POLYPHAGIA: 0
DISTURBANCES IN COORDINATION: 1
WHEEZING: 0
TREMORS: 0
HEMATURIA: 0
COUGH: 0
SPEECH CHANGE: 0
DEPRESSION: 0
FLANK PAIN: 0
EYE PAIN: 0
MUSCLE CRAMPS: 1
ARTHRALGIAS: 1
CHILLS: 1
JOINT SWELLING: 0
EYE IRRITATION: 0
SHORTNESS OF BREATH: 1
SNORES LOUDLY: 0
FEVER: 1
WEIGHT LOSS: 0
HYPERTENSION: 0
SLEEP DISTURBANCES DUE TO BREATHING: 1
INCREASED ENERGY: 1
EYE REDNESS: 0
NERVOUS/ANXIOUS: 1
WEIGHT GAIN: 1
NUMBNESS: 0
LOSS OF CONSCIOUSNESS: 0
DECREASED APPETITE: 0
POLYDIPSIA: 0
BACK PAIN: 0
DYSPNEA ON EXERTION: 1
EXERCISE INTOLERANCE: 1
WEAKNESS: 1
SPUTUM PRODUCTION: 0
ORTHOPNEA: 1
DECREASED CONCENTRATION: 1

## 2023-07-21 NOTE — LETTER
7/21/2023         RE: Maria Esther Hendrickson  [address] [place] [address]        Dear Colleague,    Thank you for referring your patient, Maria Esther Hendrickson, to the [facility]. Please see a copy of my visit note below.    Virtual Visit Details    Type of service:  Video Visit   Start: 5:30 pm  End: 6:10 pm  [place]                                                                                 - Endocrinology Initial Consultation -    Reason for visit/consult:  Long COVID, excessive fatigue    Primary care provider: Kelly - Sukhwinder, [facility]    HPI: A 42 yo female here for the endocrine evaluation of long COVID condition. Referral from Dr. Escalante, post COVID clinic [facility].   Patient has been healthy, no major medical condition prior to 8/2022 when she had COVID infection. Her symptoms was mild with mild fever and chills for a few days, then she repeated same symptoms 10 days after. Then she was ok for 1 month or so, then she developed sudden palpitation on 9/20/2022 when she was watching computer and her HR increased to 180 with SOB, her troponin was positive and hospitazed for 4 days. She was started beta blocker. After that, she has had lung fluid almost 1 year until recently, she has been followed by cardiologist.   At present, she still has frequent chest pain, dizziness and fainting. She was told POTS.   Meanwhile, she gained 17 lb, and weakness persisted. Her quality of life deteriorated, she cannot travel any more, she is able to work (she is an ) but occaisionally she needs to stop due to chest pain. WIth BP med, her HR is currently undercotnrolled 70s.  She has also experiences polyuria.       Past Medical/Surgical History:  Past Medical History:   Diagnosis Date     POTS (postural orthostatic tachycardia syndrome)      Past Surgical History:   Procedure Laterality Date     APPENDECTOMY  06/2014     AS HYSTEROSCOPY W ENDOMETRIAL BX/POLYPECTOMY W/WO D&C N/A  03/2018       Allergies:  Allergies   Allergen Reactions     Contrast Dye      No Clinical Screening - See Comments Other (See Comments)     LW Other1: -seasonal     Diatrizoate Itching     Medicated with 50mg IV Benadryl on 2- and pt sneezed three times after injection but said it was much better then last time.  Jessica Pepper ....................  2/27/2015   4:02 PM       Current Medications   Current Outpatient Medications   Medication     acetaminophen (TYLENOL) 500 MG tablet     aspirin (ASA) 81 MG EC tablet     ibuprofen (ADVIL/MOTRIN) 800 MG tablet     meloxicam (MOBIC) 15 MG tablet     propranolol ER (INDERAL LA) 60 MG 24 hr capsule     No current facility-administered medications for this visit.       Family History:  No family history on file.    Social History:  Social History     Tobacco Use     Smoking status: Never     Smokeless tobacco: Never   Substance Use Topics     Alcohol use: No       ROS:  Full review of systems taken with the help of the intake sheet. Otherwise a complete 14 point review of systems was taken and is negative unless stated in the history above.      Physical Exam:   Vitals: There were no vitals taken for this visit.  BMI= There is no height or weight on file to calculate BMI.   General: well appearing, no acute distress, pleasant and conversant,   Mental Status/neuro: alert and oriented  Face: symmetrical, normal facial color  Eyes: anicteric, no proptosis or lid lag  Resp: normally breathing    Labs : I reviewed data from epic and extract and summarize the pertinent data here.   Lab Results   Component Value Date     11/08/2018      Lab Results   Component Value Date    POTASSIUM 4.3 11/08/2018     Lab Results   Component Value Date    CHLORIDE 105 11/08/2018     Lab Results   Component Value Date    DELANEY 9.2 11/08/2018     Lab Results   Component Value Date    CO2 25 11/08/2018     Lab Results   Component Value Date    BUN 20 11/08/2018     Lab Results    Component Value Date    CR 0.63 11/08/2018     Lab Results   Component Value Date    GLC 84 11/08/2018     Lab Results   Component Value Date    TSH 0.98 11/08/2018     No results found for: T4  No results found for: A1C    No results found for: IGF1  No results found for: LH  No results found for: FSH  No results found for: ESTROGEN  No results found for: PROLACTIN        MRI Brain: I personally reviewed the original images and agree with the below reports.   No results found for this or any previous visit.          Assessment and Plan  43 year old female with POTS and challnging long COVID    - She is interested in long COVID endocrine study.   Which includes pituitary endocrine hormone panel    (cortisol, ACTH, IGF1, PRL, TSH, free T4, LH, FSH, estradiol).       - we will add on repeat CMP, urine specific gravity, renin, aldosterone      Total 45 minutes spent on the date of the encounter doing chart review, history and exam, documentation and further activities as noted above.        Yaquelin Craig MD  Staff Physician  Endocrinology and Metabolism  License: [id number]    Again, thank you for allowing me to participate in the care of your patient.        Sincerely,        Yaquelin Craig MD

## 2023-07-21 NOTE — NURSING NOTE
Is the patient currently in the state of [place]? YES    Visit mode:VIDEO    If the visit is dropped, the patient can be reconnected by: VIDEO VISIT: Text to cell phone: [phone]    Will anyone else be joining the visit? NO      How would you like to obtain your AVS? MyChart    Are changes needed to the allergy or medication list? NO    Reason for visit: Consult    QUINCY FRANK

## 2023-07-21 NOTE — PROGRESS NOTES
Virtual Visit Details    Type of service:  Video Visit   Start: 5:30 pm  End: 6:10 pm  [facility]                                                                                 - Endocrinology Initial Consultation -    Reason for visit/consult:  Long COVID, excessive fatigue    Primary care provider: Kelly - [facility]    HPI: A 42 yo female here for the endocrine evaluation of long COVID condition. Referral from Dr. Escalante, post COVID clinic [facility].   Patient has been healthy, no major medical condition prior to 8/2022 when she had COVID infection. Her symptoms was mild with mild fever and chills for a few days, then she repeated same symptoms 10 days after. Then she was ok for 1 month or so, then she developed sudden palpitation on 9/20/2022 when she was watching computer and her HR increased to 180 with SOB, her troponin was positive and hospitazed for 4 days. She was started beta blocker. After that, she has had lung fluid almost 1 year until recently, she has been followed by cardiologist.   At present, she still has frequent chest pain, dizziness and fainting. She was told POTS.   Meanwhile, she gained 17 lb, and weakness persisted. Her quality of life deteriorated, she cannot travel any more, she is able to work (she is an ) but occaisionally she needs to stop due to chest pain. WIth BP med, her HR is currently undercotnrolled 70s.  She has also experiences polyuria.       Past Medical/Surgical History:  Past Medical History:   Diagnosis Date     POTS (postural orthostatic tachycardia syndrome)      Past Surgical History:   Procedure Laterality Date     APPENDECTOMY  06/2014     AS HYSTEROSCOPY W ENDOMETRIAL BX/POLYPECTOMY W/WO D&C N/A 03/2018       Allergies:  Allergies   Allergen Reactions     Contrast Dye      No Clinical Screening - See Comments Other (See Comments)     LW Other1: -seasonal     Diatrizoate Itching     Medicated with 50mg IV Benadryl on 2- and pt sneezed three  times after injection but said it was much better then last time.  Jessica Pepper ....................  2/27/2015   4:02 PM       Current Medications   Current Outpatient Medications   Medication     acetaminophen (TYLENOL) 500 MG tablet     aspirin (ASA) 81 MG EC tablet     ibuprofen (ADVIL/MOTRIN) 800 MG tablet     meloxicam (MOBIC) 15 MG tablet     propranolol ER (INDERAL LA) 60 MG 24 hr capsule     No current facility-administered medications for this visit.       Family History:  No family history on file.    Social History:  Social History     Tobacco Use     Smoking status: Never     Smokeless tobacco: Never   Substance Use Topics     Alcohol use: No       ROS:  Full review of systems taken with the help of the intake sheet. Otherwise a complete 14 point review of systems was taken and is negative unless stated in the history above.      Physical Exam:   Vitals: There were no vitals taken for this visit.  BMI= There is no height or weight on file to calculate BMI.   General: well appearing, no acute distress, pleasant and conversant,   Mental Status/neuro: alert and oriented  Face: symmetrical, normal facial color  Eyes: anicteric, no proptosis or lid lag  Resp: normally breathing    Labs : I reviewed data from epic and extract and summarize the pertinent data here.   Lab Results   Component Value Date     11/08/2018      Lab Results   Component Value Date    POTASSIUM 4.3 11/08/2018     Lab Results   Component Value Date    CHLORIDE 105 11/08/2018     Lab Results   Component Value Date    DELANEY 9.2 11/08/2018     Lab Results   Component Value Date    CO2 25 11/08/2018     Lab Results   Component Value Date    BUN 20 11/08/2018     Lab Results   Component Value Date    CR 0.63 11/08/2018     Lab Results   Component Value Date    GLC 84 11/08/2018     Lab Results   Component Value Date    TSH 0.98 11/08/2018     No results found for: T4  No results found for: A1C    No results found for: IGF1  No results  found for: LH  No results found for: FSH  No results found for: ESTROGEN  No results found for: PROLACTIN        MRI Brain: I personally reviewed the original images and agree with the below reports.   No results found for this or any previous visit.          Assessment and Plan  43 year old female with POTS and challnging long COVID    - She is interested in long COVID endocrine study.   Which includes pituitary endocrine hormone panel    (cortisol, ACTH, IGF1, PRL, TSH, free T4, LH, FSH, estradiol).       - we will add on repeat CMP, urine specific gravity, renin, aldosterone      Total 45 minutes spent on the date of the encounter doing chart review, history and exam, documentation and further activities as noted above.        Yaquelin Craig MD  Staff Physician  Endocrinology and Metabolism  License: [id number]

## 2023-07-21 NOTE — TELEPHONE ENCOUNTER
Patient call:     Appointment type: follow up  Provider: DR. Craig  Return date: 7/21/0530pm  Speciality phone number: [phone]  Additional appointment(s) needed:   Additional notes:Dr. Phillips said this patient should preferably see you because of your long Covid study.  Viraj Connors on 7/21/2023 at 1:09 PM

## 2023-07-25 ENCOUNTER — LAB (OUTPATIENT)
Dept: LAB | Facility: CLINIC | Age: 43
End: 2023-07-25
Payer: COMMERCIAL

## 2023-07-25 DIAGNOSIS — U09.9 LONG COVID: ICD-10-CM

## 2023-07-25 LAB — SP GR UR STRIP: 1.01 (ref 1–1.03)

## 2023-07-25 PROCEDURE — 82088 ASSAY OF ALDOSTERONE: CPT

## 2023-07-25 PROCEDURE — 84244 ASSAY OF RENIN: CPT | Mod: 90

## 2023-07-25 PROCEDURE — 99000 SPECIMEN HANDLING OFFICE-LAB: CPT

## 2023-07-25 PROCEDURE — 80053 COMPREHEN METABOLIC PANEL: CPT

## 2023-07-25 PROCEDURE — 81003 URINALYSIS AUTO W/O SCOPE: CPT

## 2023-07-25 PROCEDURE — 82627 DEHYDROEPIANDROSTERONE: CPT

## 2023-07-25 PROCEDURE — 36415 COLL VENOUS BLD VENIPUNCTURE: CPT

## 2023-07-26 LAB
ALBUMIN SERPL BCG-MCNC: 4.2 G/DL (ref 3.5–5.2)
ALP SERPL-CCNC: 57 U/L (ref 35–104)
ALT SERPL W P-5'-P-CCNC: 13 U/L (ref 0–50)
ANION GAP SERPL CALCULATED.3IONS-SCNC: 11 MMOL/L (ref 7–15)
AST SERPL W P-5'-P-CCNC: 21 U/L (ref 0–45)
BILIRUB SERPL-MCNC: 0.3 MG/DL
BUN SERPL-MCNC: 8.9 MG/DL (ref 6–20)
CALCIUM SERPL-MCNC: 9.4 MG/DL (ref 8.6–10)
CHLORIDE SERPL-SCNC: 107 MMOL/L (ref 98–107)
CREAT SERPL-MCNC: 0.66 MG/DL (ref 0.51–0.95)
DEPRECATED HCO3 PLAS-SCNC: 22 MMOL/L (ref 22–29)
DHEA-S SERPL-MCNC: 59 UG/DL (ref 35–430)
GFR SERPL CREATININE-BSD FRML MDRD: >90 ML/MIN/1.73M2
GLUCOSE SERPL-MCNC: 101 MG/DL (ref 70–99)
POTASSIUM SERPL-SCNC: 4.3 MMOL/L (ref 3.4–5.3)
PROT SERPL-MCNC: 6.9 G/DL (ref 6.4–8.3)
SODIUM SERPL-SCNC: 140 MMOL/L (ref 136–145)

## 2023-07-28 LAB — ALDOST SERPL-MCNC: <3 NG/DL (ref 0–31)

## 2023-08-02 LAB — RENIN PLAS-CCNC: <0.1 NG/ML/HR

## 2023-08-14 ENCOUNTER — TRANSFERRED RECORDS (OUTPATIENT)
Dept: HEALTH INFORMATION MANAGEMENT | Facility: CLINIC | Age: 43
End: 2023-08-14

## 2023-09-12 ENCOUNTER — VIRTUAL VISIT (OUTPATIENT)
Dept: PHYSICAL MEDICINE AND REHAB | Facility: CLINIC | Age: 43
End: 2023-09-12
Payer: COMMERCIAL

## 2023-09-12 VITALS — HEIGHT: 69 IN | BODY MASS INDEX: 26.36 KG/M2 | WEIGHT: 178 LBS

## 2023-09-12 DIAGNOSIS — F41.9 POST-COVID CHRONIC ANXIETY: Primary | ICD-10-CM

## 2023-09-12 DIAGNOSIS — U09.9 POST-COVID CHRONIC ANXIETY: Primary | ICD-10-CM

## 2023-09-12 DIAGNOSIS — R00.2 POST-COVID CHRONIC PALPITATIONS: ICD-10-CM

## 2023-09-12 DIAGNOSIS — U09.9 POST-COVID CHRONIC PALPITATIONS: ICD-10-CM

## 2023-09-12 PROCEDURE — 99204 OFFICE O/P NEW MOD 45 MIN: CPT | Mod: VID | Performed by: INTERNAL MEDICINE

## 2023-09-12 ASSESSMENT — ENCOUNTER SYMPTOMS
SEIZURES: 0
DISTURBANCES IN COORDINATION: 1
DECREASED APPETITE: 0
TASTE DISTURBANCE: 1
COUGH DISTURBING SLEEP: 0
DEPRESSION: 0
COUGH: 1
PALPITATIONS: 1
DYSURIA: 0
BACK PAIN: 0
SINUS PAIN: 0
TROUBLE SWALLOWING: 0
CHILLS: 1
WHEEZING: 0
SLEEP DISTURBANCES DUE TO BREATHING: 1
SMELL DISTURBANCE: 0
MUSCLE CRAMPS: 0
ALTERED TEMPERATURE REGULATION: 1
WEIGHT LOSS: 0
FATIGUE: 1
POSTURAL DYSPNEA: 1
SNORES LOUDLY: 0
SPUTUM PRODUCTION: 0
POLYPHAGIA: 0
MUSCLE WEAKNESS: 1
DYSPNEA ON EXERTION: 0
HYPOTENSION: 1
TINGLING: 1
DIZZINESS: 1
NUMBNESS: 0
NERVOUS/ANXIOUS: 1
SYNCOPE: 0
HEMOPTYSIS: 0
JOINT SWELLING: 0
LIGHT-HEADEDNESS: 1
WEIGHT GAIN: 1
SHORTNESS OF BREATH: 1
INCREASED ENERGY: 1
HALLUCINATIONS: 0
STIFFNESS: 0
EYE IRRITATION: 0
NIGHT SWEATS: 1
NECK MASS: 0
EYE WATERING: 0
TREMORS: 0
PANIC: 0
MEMORY LOSS: 0
HEMATURIA: 0
DOUBLE VISION: 1
INSOMNIA: 1
ORTHOPNEA: 1
DIFFICULTY URINATING: 1
PARALYSIS: 0
FLANK PAIN: 0
HEADACHES: 0
DECREASED CONCENTRATION: 1
EYE PAIN: 0
HOARSE VOICE: 0
POLYDIPSIA: 1
LOSS OF CONSCIOUSNESS: 0
MYALGIAS: 1
NECK PAIN: 0
EXERCISE INTOLERANCE: 1
LEG PAIN: 0
SINUS CONGESTION: 0
SORE THROAT: 0
FEVER: 0
HYPERTENSION: 0
EYE REDNESS: 0
SPEECH CHANGE: 0
ARTHRALGIAS: 1

## 2023-09-12 ASSESSMENT — PATIENT HEALTH QUESTIONNAIRE - PHQ9
SUM OF ALL RESPONSES TO PHQ QUESTIONS 1-9: 6
10. IF YOU CHECKED OFF ANY PROBLEMS, HOW DIFFICULT HAVE THESE PROBLEMS MADE IT FOR YOU TO DO YOUR WORK, TAKE CARE OF THINGS AT HOME, OR GET ALONG WITH OTHER PEOPLE: SOMEWHAT DIFFICULT
SUM OF ALL RESPONSES TO PHQ QUESTIONS 1-9: 6

## 2023-09-12 ASSESSMENT — ANXIETY QUESTIONNAIRES
1. FEELING NERVOUS, ANXIOUS, OR ON EDGE: MORE THAN HALF THE DAYS
7. FEELING AFRAID AS IF SOMETHING AWFUL MIGHT HAPPEN: SEVERAL DAYS
4. TROUBLE RELAXING: SEVERAL DAYS
GAD7 TOTAL SCORE: 10
2. NOT BEING ABLE TO STOP OR CONTROL WORRYING: MORE THAN HALF THE DAYS
5. BEING SO RESTLESS THAT IT IS HARD TO SIT STILL: MORE THAN HALF THE DAYS
IF YOU CHECKED OFF ANY PROBLEMS ON THIS QUESTIONNAIRE, HOW DIFFICULT HAVE THESE PROBLEMS MADE IT FOR YOU TO DO YOUR WORK, TAKE CARE OF THINGS AT HOME, OR GET ALONG WITH OTHER PEOPLE: SOMEWHAT DIFFICULT
6. BECOMING EASILY ANNOYED OR IRRITABLE: SEVERAL DAYS
3. WORRYING TOO MUCH ABOUT DIFFERENT THINGS: SEVERAL DAYS
GAD7 TOTAL SCORE: 10

## 2023-09-12 ASSESSMENT — PAIN SCALES - GENERAL: PAINLEVEL: EXTREME PAIN (8)

## 2023-09-12 NOTE — PROGRESS NOTES
"Virtual Visit Details    Type of service:  Video Visit   Video Start Time: {video visit start/end time for provider to select:304174}  Video End Time:{video visit start/end time for provider to select:501547}    Originating Location (pt. Location): {video visit patient location:124771::\"Home\"}  {PROVIDER LOCATION On-site should be selected for visits conducted from your clinic location or adjoining [facility] hospital, academic office, or other nearby [facility] building. Off-site should be selected for all other provider locations, including home:420210}  Distant Location (provider location):  {virtual location provider:365935}  Platform used for Video Visit: {Virtual Visit Platforms:595926::\""[employer]\"}     "

## 2023-09-12 NOTE — LETTER
"9/12/2023       RE: Maria Esther Hendrickson  [address] [place] [address]     Dear Colleague,    Thank you for referring your patient, Maria Esther Hendrickson, to the [facility] PHYSICAL MEDICINE AND REHABILITATION CLINIC [place] at [facility]. Please see a copy of my visit note below.    Maria Esther is a 43 year old who is being evaluated via a billable video visit.      How would you like to obtain your AVS? MyChart  If the video visit is dropped, the invitation should be resent by: Text to cell phone: [phone]  Will anyone else be joining your video visit? No          Assessment & Plan    Post-COVID chronic anxiety  Reviewed management of anxiety following COVID-19 with patient.  Patient was encouraged to consider counseling.  Recommend referral was placed today.  Also recommend considering use of medications for management of anxiety.  Patient is hesitant to start sertraline that was recently prescribed for her.    - Adult Mental Health  Referral; Future    Post-COVID chronic palpitations  Reviewed palpitations with patient.  Her cardiologist was planning to discontinue metoprolol.  Patient was encouraged to discuss medications with with her current provider without stopping it abruptly.  Suspect possible contribution of anxiety due to shortness of breath that patient is feeling intermittently rather than significant tachycardia driving those events.  Patient expressed understanding and agreement with the plan.      I spent a total of 49 minutes on the day of the visit.   Time spent by me doing chart review, history and exam, documentation and further activities per the note       BMI:   Estimated body mass index is 26.29 kg/m  as calculated from the following:    Height as of this encounter: 1.753 m (5' 9\").    Weight as of this encounter: 80.7 kg (178 lb).           Return in about 3 months (around 12/12/2023).    Yanna Palomo MD  [facility] [place] PHYSICAL MEDICINE AND REHABILITATION CLINIC KIM Mayo is a 43 year old, presenting for the following health issues:  RECHECK  RECHECK    HPI     Patient is following up on post-COVID condition. She was diagnosed in August of 2022. Her biggest issues has been palpitations. She was recently switched to metoprolol, states that her blood pressure was low, and she is concerned about about the best approach. She is currently on 25 mg, started taking on August 29th. She was taking propranolol prior to switching to metoprolol. She is usually taking metoprolol at night. She states that she woke up at night with shortness of breath. She reports that her blood pressure was still low in the 90 systolic range. She reports that she is also having some chest discomfort.       Current concerns: Health Concerns        9/12/2023    12:09 PM   PHQ Assesment Total Score(s)   PHQ-9 Score 6         9/12/2023    12:09 PM   JESSICA-7 Results   JESSICA 7 TOTAL SCORE 10 (moderate anxiety)   JESSICA-7 Total Score 10         9/12/2023    12:10 PM   PTSD Screen Score   Have you ever experienced this kind of event? No         9/12/2023    12:18 PM   PROMIS-29   PROMIS Physical Function T-Score 57 (within normal limits)   PROMIS Anxiety T-Score 61 (moderate)   PROMIS Depression T-Score 41 (within normal limits)   PROMIS Fatigue T-Score 63 (moderate)   PROMIS Sleep Disturbance T-Score Incomplete   PROMIS Ability to Participate in Social Roles & Activities T-Score 45 (within normal limits)   PROMIS Pain Interference T-Score 61 (moderate)   PROMIS Pain Intensity 7         Past Medical History:   Diagnosis Date    POTS (postural orthostatic tachycardia syndrome)        Past Surgical History:   Procedure Laterality Date    APPENDECTOMY  06/2014    AS HYSTEROSCOPY W ENDOMETRIAL BX/POLYPECTOMY W/WO D&C N/A 03/2018       No family history on file.    Social History     Tobacco Use    Smoking status: Never    Smokeless tobacco: Never    Vaping Use    Vaping Use: Never used   Substance Use Topics    Alcohol use: No    Drug use: No         Current Outpatient Medications:     acetaminophen (TYLENOL) 500 MG tablet, Take 500 mg by mouth, Disp: , Rfl:     aspirin (ASA) 81 MG EC tablet, Take 81 mg by mouth, Disp: , Rfl:     ibuprofen (ADVIL/MOTRIN) 800 MG tablet, Take 800 mg by mouth, Disp: , Rfl:     meloxicam (MOBIC) 15 MG tablet, SMARTSI.5 Tablet(s) By Mouth Twice Daily, Disp: , Rfl:     propranolol ER (INDERAL LA) 60 MG 24 hr capsule, Take 60 mg by mouth, Disp: , Rfl:       Review of Systems   Constitutional, HEENT, cardiovascular, pulmonary, GI, , musculoskeletal, neuro, skin, endocrine and psych systems are negative, except as otherwise noted.      Objective   Vitals - Patient Reported  Pain Score: Extreme Pain (8)  Pain Loc: Chest      Physical Exam   GENERAL: Healthy, alert and no distress  EYES: Eyes grossly normal to inspection.  No discharge or erythema, or obvious scleral/conjunctival abnormalities.  RESP: No audible wheeze, cough, or visible cyanosis.  No visible retractions or increased work of breathing.    SKIN: Visible skin clear. No significant rash, abnormal pigmentation or lesions.  NEURO: Cranial nerves grossly intact.  Mentation and speech appropriate for age.  PSYCH: Mentation appears normal, affect normal/bright, judgement and insight intact, normal speech and appearance well-groomed.      Again, thank you for allowing me to participate in the care of your patient.      Sincerely,    Yanna Palomo MD

## 2023-09-12 NOTE — NURSING NOTE
Is the patient currently in the state of [place]? YES    Visit mode:VIDEO    If the visit is dropped, the patient can be reconnected by: VIDEO VISIT: Send to e-mail at: [email or web address]    Will anyone else be joining the visit? NO  (If patient encounters technical issues they should call [phone])    How would you like to obtain your AVS? MyChart    Are changes needed to the allergy or medication list? Pt stated no changes to allergies and Pt stated no med changes    Reason for visit: MELODY ANTHONYF

## 2023-09-12 NOTE — PROGRESS NOTES
"Maria Esther is a 43 year old who is being evaluated via a billable video visit.      How would you like to obtain your AVS? MyChart  If the video visit is dropped, the invitation should be resent by: Text to cell phone: [phone]  Will anyone else be joining your video visit? No          Assessment & Plan     Post-COVID chronic anxiety  Reviewed management of anxiety following COVID-19 with patient.  Patient was encouraged to consider counseling.  Recommend referral was placed today.  Also recommend considering use of medications for management of anxiety.  Patient is hesitant to start sertraline that was recently prescribed for her.    - Adult Mental Health  Referral; Future    Post-COVID chronic palpitations  Reviewed palpitations with patient.  Her cardiologist was planning to discontinue metoprolol.  Patient was encouraged to discuss medications with with her current provider without stopping it abruptly.  Suspect possible contribution of anxiety due to shortness of breath that patient is feeling intermittently rather than significant tachycardia driving those events.  Patient expressed understanding and agreement with the plan.      I spent a total of 49 minutes on the day of the visit.   Time spent by me doing chart review, history and exam, documentation and further activities per the note       BMI:   Estimated body mass index is 26.29 kg/m  as calculated from the following:    Height as of this encounter: 1.753 m (5' 9\").    Weight as of this encounter: 80.7 kg (178 lb).           Return in about 3 months (around 12/12/2023).    Yanna Palomo MD  [facility] PHYSICAL MEDICINE AND REHABILITATION CLINIC Telephone    Subjective   Maria Esther is a 43 year old, presenting for the following health issues:  RECHECK  RECHECK    HPI     Patient is following up on post-COVID condition. She was diagnosed in August of 2022. Her biggest issues has been palpitations. She was recently switched to " metoprolol, states that her blood pressure was low, and she is concerned about about the best approach. She is currently on 25 mg, started taking on August 29th. She was taking propranolol prior to switching to metoprolol. She is usually taking metoprolol at night. She states that she woke up at night with shortness of breath. She reports that her blood pressure was still low in the 90 systolic range. She reports that she is also having some chest discomfort.       Current concerns: Health Concerns        9/12/2023    12:09 PM   PHQ Assesment Total Score(s)   PHQ-9 Score 6         9/12/2023    12:09 PM   JESSICA-7 Results   JESSICA 7 TOTAL SCORE 10 (moderate anxiety)   JESSICA-7 Total Score 10         9/12/2023    12:10 PM   PTSD Screen Score   Have you ever experienced this kind of event? No         9/12/2023    12:18 PM   PROMIS-29   PROMIS Physical Function T-Score 57 (within normal limits)   PROMIS Anxiety T-Score 61 (moderate)   PROMIS Depression T-Score 41 (within normal limits)   PROMIS Fatigue T-Score 63 (moderate)   PROMIS Sleep Disturbance T-Score Incomplete   PROMIS Ability to Participate in Social Roles & Activities T-Score 45 (within normal limits)   PROMIS Pain Interference T-Score 61 (moderate)   PROMIS Pain Intensity 7         Past Medical History:   Diagnosis Date    POTS (postural orthostatic tachycardia syndrome)        Past Surgical History:   Procedure Laterality Date    APPENDECTOMY  06/2014    AS HYSTEROSCOPY W ENDOMETRIAL BX/POLYPECTOMY W/WO D&C N/A 03/2018       No family history on file.    Social History     Tobacco Use    Smoking status: Never    Smokeless tobacco: Never   Vaping Use    Vaping Use: Never used   Substance Use Topics    Alcohol use: No    Drug use: No         Current Outpatient Medications:     acetaminophen (TYLENOL) 500 MG tablet, Take 500 mg by mouth, Disp: , Rfl:     aspirin (ASA) 81 MG EC tablet, Take 81 mg by mouth, Disp: , Rfl:     ibuprofen (ADVIL/MOTRIN) 800 MG tablet, Take  800 mg by mouth, Disp: , Rfl:     meloxicam (MOBIC) 15 MG tablet, SMARTSI.5 Tablet(s) By Mouth Twice Daily, Disp: , Rfl:     propranolol ER (INDERAL LA) 60 MG 24 hr capsule, Take 60 mg by mouth, Disp: , Rfl:       Review of Systems   Constitutional, HEENT, cardiovascular, pulmonary, GI, , musculoskeletal, neuro, skin, endocrine and psych systems are negative, except as otherwise noted.      Objective    Vitals - Patient Reported  Pain Score: Extreme Pain (8)  Pain Loc: Chest      Physical Exam   GENERAL: Healthy, alert and no distress  EYES: Eyes grossly normal to inspection.  No discharge or erythema, or obvious scleral/conjunctival abnormalities.  RESP: No audible wheeze, cough, or visible cyanosis.  No visible retractions or increased work of breathing.    SKIN: Visible skin clear. No significant rash, abnormal pigmentation or lesions.  NEURO: Cranial nerves grossly intact.  Mentation and speech appropriate for age.  PSYCH: Mentation appears normal, affect normal/bright, judgement and insight intact, normal speech and appearance well-groomed.                Video-Visit Details    Type of service:  Video Visit   Video Start Time:  1:00 PM  Video End Time: 1:45 PM    Originating Location (pt. Location): Home    Distant Location (provider location):  Off-site  Platform used for Video Visit: [employer]

## 2023-09-17 ENCOUNTER — TRANSFERRED RECORDS (OUTPATIENT)
Dept: HEALTH INFORMATION MANAGEMENT | Facility: CLINIC | Age: 43
End: 2023-09-17
Payer: COMMERCIAL

## 2023-10-06 NOTE — TELEPHONE ENCOUNTER
RECORDS RECEIVED FROM:    DATE RECEIVED:    NOTES STATUS DETAILS   OFFICE NOTE from referring provider  Internal S Boris 6-29-23   OFFICE NOTE from other cardiologists  Care Everywhere 8-29-23 Dr. Abel Mora   RECORDS from hospital/ED Care Everywhere 8-14-23   MEDICATION LIST Internal    GENERAL CARDIO RECORDS   (ALL APPOINTMENT TYPES)     LABS (CBC,BMP,CMP, TSH) Internal    EKG (STRIPS & REPORTS) In process 9-17-23 Requested   MONITORS (STRIPS & REPORTS) N/A    ECHOS (IMAGES AND REPORTS) In process 6-8-23 Requested   STRESS TESTS (IMAGES AND REPORTS) N/A    cMRI (IMAGES AND REPORTS) In process 9-13-22 Requested   CT/CTA (IMAGES AND REPORTS) In process 10-13-22 Requested    NEW POTS     NEUROLOGY OFFICE NOTES Care Everywhere 9-20-23 Dr. Angela BLUE   ICD/PACEMAKER IMPLANT No    CARDIOVERSION N/A    TILT TABLE STUDIES N/A      Action 10/6/23 Morgan  Fax #[phone]   Action Taken Requested:  EKG strips  Echo  CT Chest  cMRI  CT Cardiac coronary 9-17-23, 8-14-23  6-8-23  10-13-22  9-13-22  9-13-22     Sent EKGs to scanning. Resolved CT s and MRI, sent second request for echo, sent green 10/17    Resolved echo in PACS 10/20

## 2023-10-19 NOTE — PROGRESS NOTES
General Cardiology [facility]      Referring provider:    HPI: Ms. Maria Esther Hendrickson is a 43 year old  female with PMH significant for  -COVID-19 in August of 2022.      Patient has history of palpitations and dizziness as well as chest discomfort which started after she contracted COVID-19 infection in September 2022.  She was hospitalized at [facility] for these symptoms.  She had mildly elevated troponin and underwent CT coronary angiogram which showed calcium score of 0 with no coronary artery disease. Cardiac MRI showed preserved biventricular function, no delayed enhancement and normal pericardium. She was discharged home on metoprolol, initially at 50 twice daily but then decreased to 25 twice daily. With that, she had low blood pressure and eventually she was transitioned to propranolol due to concern over POTS diagnosis. She initially was on long-acting propranolol but that resulted in hypotension. At some point there was some concern in the cardiac MRI with mild equivocal small area of edema in the mid anterior and anteroseptal segments therefore she was prescribed colchicine.    With time, her palpitations and feeling of elevated heart rate have significantly improved and at this point she feels that these are mostly under control. She recalls times when her heart rate would go up to 150 bpm when she stands but that has completely resolved. More recently, Ms. Hendrickson has been predominantly having symptoms of squeezing sensation in her chest after she takes her propranolol. She came off propranolol for few weeks and she did not have any similar symptoms but while off beta-blocker she would feel like her heart is pounding very strong and she feels short of breath with that. This symptoms are very sporadic and nonexertional. They are not postural in nature. When she gets either her chest discomfort or her shortness of breath she feels very anxious and tearful. She has not had any syncope or presyncope  recently.    Ms. Hendrickson was evaluated in the emergency room for shortness of breath and chest pain in mid July 2023. EKG showed normal sinus rhythm with normal intervals. Symptoms improved after receiving IV fluids. She presented again to the emergency room in mid August 2023 with headache and lightheadedness along with chest pain or shortness of breath. Her EKG showed normal sinus rhythm at 64 bpm with normal intervals and no acute changes.     Recently seen by by EP at [facility] on 8/29/2023.  Patient was told to start metoprolol XL 25 mg and use propranolol 5 mg as needed.  She was also recommended to continue hydration, exercise.  She was felt that she no longer has POTS syndrome.     Today patient tells me that she continues to feel short of breath with or without activity sometimes at rest which last for few seconds. She followed-up with Long COVID and referred to us.  Patient tells me that sometimes when she is watching TV she feels short of breath lasting for few seconds.  She drinks cold water which helps to resolve shortness of breath.  Recently started walking however which makes her feel good.  Denies exertional shortness of breath. No syncope.  She does not feel palpitations when she is standing.  Her heart rate used to be very high when she was doing minimal activity when she had COVID a year ago.  That has resolved now.  She is taking metoprolol 12.5 mg daily.  Sometimes she feels like her heart is racing which brings on shortness of breath but this does not occur very often.  She tells me that since she had COVID last time a year ago (which she had 3 times) she has started urinary urgency and need to use the bathroom almost 5 to 10 minutes.  She complains about urinary incontinence which is new to her since she had COVID.  This has affected her quality of life.       PAST MEDICAL HISTORY:  Past Medical History:   Diagnosis Date    POTS (postural orthostatic tachycardia syndrome)        CURRENT  MEDICATIONS:  Current Outpatient Medications   Medication Sig Dispense Refill    acetaminophen (TYLENOL) 500 MG tablet Take 500 mg by mouth      aspirin (ASA) 81 MG EC tablet Take 81 mg by mouth      ibuprofen (ADVIL/MOTRIN) 800 MG tablet Take 800 mg by mouth      meloxicam (MOBIC) 15 MG tablet SMARTSI.5 Tablet(s) By Mouth Twice Daily      propranolol ER (INDERAL LA) 60 MG 24 hr capsule Take 60 mg by mouth         PAST SURGICAL HISTORY:  Past Surgical History:   Procedure Laterality Date    APPENDECTOMY  2014    AS HYSTEROSCOPY W ENDOMETRIAL BX/POLYPECTOMY W/WO D&C N/A 2018       ALLERGIES:     Allergies   Allergen Reactions    Contrast Dye     No Clinical Screening - See Comments Other (See Comments)     LW Other1: -seasonal    Diatrizoate Itching     Medicated with 50mg IV Benadryl on 2015 and pt sneezed three times after injection but said it was much better then last time.  Jessica Pepper ....................  2015   4:02 PM       FAMILY HISTORY:  No family history on file.      SOCIAL HISTORY:  Social History     Tobacco Use    Smoking status: Never    Smokeless tobacco: Never   Vaping Use    Vaping Use: Never used   Substance Use Topics    Alcohol use: No    Drug use: No       ROS:   Constitutional: No fever, chills, or sweats. Weight stable.   Cardiovascular: As per HPI.       Exam:  /74 (BP Location: Right arm, Patient Position: Chair, Cuff Size: Adult Regular)   Pulse 69   Wt 81.6 kg (180 lb)   SpO2 98%   BMI 26.58 kg/m    GENERAL APPEARANCE: alert and no distress  HEENT: no icterus, no central cyanosis  LYMPH/NECK: no adenopathy, no asymmetry, JVP not elevated  RESPIRATORY: lungs clear to auscultation - no rales, rhonchi or wheezes, no use of accessory muscles, no retractions, respirations are unlabored, normal respiratory rate  CARDIOVASCULAR: regular rhythm, normal S1, S2, no S3 or S4 and no murmur, click or rub, precordium quiet with normal PMI.  GI: soft, non  tender  EXTREMITIES:edema  NEURO: alert, normal speech,and affect  SKIN: no ecchymoses, no rashes     I have reviewed the labs and personally reviewed the imaging below and made my comment in the assessment and plan.    Labs:  CBC RESULTS:   Lab Results   Component Value Date    WBC 7.7 11/08/2018    RBC 4.64 11/08/2018    HGB 13.0 11/08/2018    HCT 40.4 11/08/2018    MCV 87 11/08/2018    MCH 28.0 11/08/2018    MCHC 32.2 11/08/2018    RDW 13.1 11/08/2018     11/08/2018       BMP RESULTS:  Lab Results   Component Value Date     07/25/2023     11/08/2018    POTASSIUM 4.3 07/25/2023    POTASSIUM 4.3 11/08/2018    CHLORIDE 107 07/25/2023    CHLORIDE 105 11/08/2018    CO2 22 07/25/2023    CO2 25 11/08/2018    ANIONGAP 11 07/25/2023    ANIONGAP 8 11/08/2018     (H) 07/25/2023    GLC 84 11/08/2018    BUN 8.9 07/25/2023    BUN 20 11/08/2018    CR 0.66 07/25/2023    CR 0.63 11/08/2018    GFRESTIMATED >90 07/25/2023    GFRESTIMATED >90 11/08/2018    GFRESTBLACK >90 11/08/2018    DELANEY 9.4 07/25/2023    DELANEY 9.2 11/08/2018      ECHO (report reviewed):   Limited TTE 11/28/22  Final Conclusion Previous Study: 11-    Visually Estimated EF: 55-60%    Normal global left ventricular size, wall thickness, and ejection function with no obvious   regional wall motion/thickening abnormalities.    Normal left atrial size.    Mild aortic regurgitation.    Very small pericardial effusion seen.    Dilated IVC size, >50% collapse with respiration.      TTE 11/15/22  Final Conclusion Previous Study: 3-    Visually Estimated EF: 55-60%    Normal left ventricular size, wall thickness, and global systolic function with no obvious   regional wall motion abnormalities.    Normal right ventricular size and function.    No significant valvular heart disease noted.    Small pericardial effusion.    Dilated IVC size, <50% collapse with respiration.    No prior studies available for comparison        Cardiac MRI  9/13/22  Final conclusions:   1. The gadolinium delay enhancement shows no scar/fibrosis/inflammation in   the ventricular myocardium.   2. The left ventricle size is normal.  The LV wall thickness is normal.     There is no LV wall motion abnormality. The LV systolic function is   normal.  Calculated LVEF is 57%.     3. The right ventricle is normal in size and wall motion. RV systolic   function is preserved (calclated RVEF 53%).   4. No significant valve dysfunction noted.   5. Pericardium is within normal limits (no significant delayed enhancement   or effusion); no evidence of constriction on real time imaging.   6. Non cardiac finding reported separately below, per Radiology.       Cardiac cath: from 9/13/2022 demonstrated   Calcium Score: 0   Left Main: 0   LAD: 0   LCX:  0   RCA: 0     Calcium Score Percentile: 0   This means that the 0% of asymptomatic people of the same age, gender, and   race will have a lower calcium score.           Impression and plan:    #COVID infection x3 (none of the injections required hospitalization) last COVID infection a year ago.    #Post COVID chronic anxiety  #POST COVID POTS likely resolved now  -After last COVID infection a year ago patient had severe POTS symptoms.  So far cardiac work-up has been unremarkable.  POTS symptoms gradually improved over time.  Currently she has no symptoms suggestive of POTS.  Very rarely she reports feeling palpitations that brings on shortness of breath.  I have discussed with her that she can take extra metoprolol 12.5 mg in the morning to see if that helps.  I suggested a repeat Zio patch but the patient declined.  She is very educated about hydration, salt and physical activity.  She recently started walking half an hour and she is feeling well with that.  Her physical exam is unremarkable.  I have discussed with the patient that she no longer has POTS.  No concernin cardiac symptoms at this time.    Plan:  Continue metoprolol 12.5 mg  in the evening.  I suggested her to take extra 12.5 mg in the morning to see if that helps with her occasional palpitations that brings on shortness of breath.  I do not think she needs cardiac work-up at this time given normal extensive cardiac work-up until now.  I have mentioned this to the patient.  She does not want to do Zio patch at this time.  If she has further palpitations we can consider this.    I have discussed with the patient that she can stay on metoprolol 12.5 MG BID for 6 months or so and then she can reduce the dose to once a day and then gradually to every other day and she can gradually stop metoprolol if she feels well off of it.    Given urinary urgency/incontinence I will refer the patient to urology.  Follow-up with cardiology as needed.  I also discussed with her that she does not need aspirin.  Patient has significant side effects with propranolol and she is not taking it.    I am highly suspecting that few seconds lasting shortness of breath that she describes which is nonexertional most of the time is likely related to anxiety/stress (relieved with cold water).  She was given Zoloft but she declines to use medications.    Return to cardiology as needed.     Total time spent today for this visit is 90 minutes including precharting, face-to-face clinic visit, review of labs/imaging and medical documentation.    Wilder ARZATE MD  [facility] Division of Cardiology  Pager [phone]

## 2023-10-23 ENCOUNTER — OFFICE VISIT (OUTPATIENT)
Dept: CARDIOLOGY | Facility: CLINIC | Age: 43
End: 2023-10-23
Attending: INTERNAL MEDICINE
Payer: COMMERCIAL

## 2023-10-23 ENCOUNTER — PRE VISIT (OUTPATIENT)
Dept: CARDIOLOGY | Facility: CLINIC | Age: 43
End: 2023-10-23
Payer: COMMERCIAL

## 2023-10-23 VITALS
DIASTOLIC BLOOD PRESSURE: 74 MMHG | SYSTOLIC BLOOD PRESSURE: 124 MMHG | WEIGHT: 180 LBS | HEART RATE: 69 BPM | OXYGEN SATURATION: 98 % | BODY MASS INDEX: 26.58 KG/M2

## 2023-10-23 DIAGNOSIS — F41.9 POST-COVID CHRONIC ANXIETY: Primary | ICD-10-CM

## 2023-10-23 DIAGNOSIS — U09.9 POST-COVID CHRONIC FATIGUE: ICD-10-CM

## 2023-10-23 DIAGNOSIS — U09.9 POST-COVID CHRONIC DECREASED MOBILITY AND ENDURANCE: ICD-10-CM

## 2023-10-23 DIAGNOSIS — G93.32 POST-COVID CHRONIC FATIGUE: ICD-10-CM

## 2023-10-23 DIAGNOSIS — U09.9 LONG COVID: ICD-10-CM

## 2023-10-23 DIAGNOSIS — N39.41 URGE INCONTINENCE OF URINE: ICD-10-CM

## 2023-10-23 DIAGNOSIS — Z74.09 POST-COVID CHRONIC DECREASED MOBILITY AND ENDURANCE: ICD-10-CM

## 2023-10-23 DIAGNOSIS — U09.9 POST-COVID CHRONIC ANXIETY: Primary | ICD-10-CM

## 2023-10-23 PROCEDURE — 99417 PROLNG OP E/M EACH 15 MIN: CPT | Performed by: INTERNAL MEDICINE

## 2023-10-23 PROCEDURE — G0463 HOSPITAL OUTPT CLINIC VISIT: HCPCS | Performed by: INTERNAL MEDICINE

## 2023-10-23 PROCEDURE — 99215 OFFICE O/P EST HI 40 MIN: CPT | Performed by: INTERNAL MEDICINE

## 2023-10-23 RX ORDER — METOPROLOL SUCCINATE 25 MG/1
1 TABLET, EXTENDED RELEASE ORAL DAILY
COMMUNITY
Start: 2023-08-29

## 2023-10-23 ASSESSMENT — PAIN SCALES - GENERAL: PAINLEVEL: NO PAIN (0)

## 2023-10-23 NOTE — PATIENT INSTRUCTIONS
Per your discussion with Dr. Tanner : you can take 12.5 mg of metoprolol in the morning (half of your current 25 mg dose), and after 6 months reduce to taking 12.5 mg every other day.  You can stop taking metoprolol if you are feeling well.     Stop taking aspirin    Referral placed to urology for urinary incontinence - they will call you to schedule.    Follow up with Dr. Tanner as needed.

## 2023-10-23 NOTE — LETTER
10/23/2023      RE: Maria Esther Hendrickson  [address] [place] [address]       Dear Colleague,    Thank you for the opportunity to participate in the care of your patient, Maria Esther Hendrickson, at the [facility] [place] at [facility]. Please see a copy of my visit note below.          General Cardiology Clinic-[place]      Referring provider:    HPI: Ms. Maria Esther Hendrickson is a 43 year old  female with PMH significant for  -COVID-19 in August of 2022.      Patient has history of palpitations and dizziness as well as chest discomfort which started after she contracted COVID-19 infection in September 2022.  She was hospitalized at [facility] for these symptoms.  She had mildly elevated troponin and underwent CT coronary angiogram which showed calcium score of 0 with no coronary artery disease. Cardiac MRI showed preserved biventricular function, no delayed enhancement and normal pericardium. She was discharged home on metoprolol, initially at 50 twice daily but then decreased to 25 twice daily. With that, she had low blood pressure and eventually she was transitioned to propranolol due to concern over POTS diagnosis. She initially was on long-acting propranolol but that resulted in hypotension. At some point there was some concern in the cardiac MRI with mild equivocal small area of edema in the mid anterior and anteroseptal segments therefore she was prescribed colchicine.    With time, her palpitations and feeling of elevated heart rate have significantly improved and at this point she feels that these are mostly under control. She recalls times when her heart rate would go up to 150 bpm when she stands but that has completely resolved. More recently, Ms. Hendrickson has been predominantly having symptoms of squeezing sensation in her chest after she takes her propranolol. She came off propranolol for few weeks and she did not have any similar symptoms but while  off beta-blocker she would feel like her heart is pounding very strong and she feels short of breath with that. This symptoms are very sporadic and nonexertional. They are not postural in nature. When she gets either her chest discomfort or her shortness of breath she feels very anxious and tearful. She has not had any syncope or presyncope recently.    Ms. Hendrickson was evaluated in the emergency room for shortness of breath and chest pain in mid July 2023. EKG showed normal sinus rhythm with normal intervals. Symptoms improved after receiving IV fluids. She presented again to the emergency room in mid August 2023 with headache and lightheadedness along with chest pain or shortness of breath. Her EKG showed normal sinus rhythm at 64 bpm with normal intervals and no acute changes.     Recently seen by by EP at [facility] on 8/29/2023.  Patient was told to start metoprolol XL 25 mg and use propranolol 5 mg as needed.  She was also recommended to continue hydration, exercise.  She was felt that she no longer has POTS syndrome.     Today patient tells me that she continues to feel short of breath with or without activity sometimes at rest which last for few seconds. She followed-up with Long COVID and referred to us.  Patient tells me that sometimes when she is watching TV she feels short of breath lasting for few seconds.  She drinks cold water which helps to resolve shortness of breath.  Recently started walking however which makes her feel good.  Denies exertional shortness of breath. No syncope.  She does not feel palpitations when she is standing.  Her heart rate used to be very high when she was doing minimal activity when she had COVID a year ago.  That has resolved now.  She is taking metoprolol 12.5 mg daily.  Sometimes she feels like her heart is racing which brings on shortness of breath but this does not occur very often.  She tells me that since she had COVID last time a year ago (which she had 3 times) she has  started urinary urgency and need to use the bathroom almost 5 to 10 minutes.  She complains about urinary incontinence which is new to her since she had COVID.  This has affected her quality of life.       PAST MEDICAL HISTORY:  Past Medical History:   Diagnosis Date     POTS (postural orthostatic tachycardia syndrome)        CURRENT MEDICATIONS:  Current Outpatient Medications   Medication Sig Dispense Refill     acetaminophen (TYLENOL) 500 MG tablet Take 500 mg by mouth       aspirin (ASA) 81 MG EC tablet Take 81 mg by mouth       ibuprofen (ADVIL/MOTRIN) 800 MG tablet Take 800 mg by mouth       meloxicam (MOBIC) 15 MG tablet SMARTSI.5 Tablet(s) By Mouth Twice Daily       propranolol ER (INDERAL LA) 60 MG 24 hr capsule Take 60 mg by mouth         PAST SURGICAL HISTORY:  Past Surgical History:   Procedure Laterality Date     APPENDECTOMY  2014     AS HYSTEROSCOPY W ENDOMETRIAL BX/POLYPECTOMY W/WO D&C N/A 2018       ALLERGIES:     Allergies   Allergen Reactions     Contrast Dye      No Clinical Screening - See Comments Other (See Comments)     LW Other1: -seasonal     Diatrizoate Itching     Medicated with 50mg IV Benadryl on 2015 and pt sneezed three times after injection but said it was much better then last time.  Jessica Pepper ....................  2015   4:02 PM       FAMILY HISTORY:  No family history on file.      SOCIAL HISTORY:  Social History     Tobacco Use     Smoking status: Never     Smokeless tobacco: Never   Vaping Use     Vaping Use: Never used   Substance Use Topics     Alcohol use: No     Drug use: No       ROS:   Constitutional: No fever, chills, or sweats. Weight stable.   Cardiovascular: As per HPI.       Exam:  /74 (BP Location: Right arm, Patient Position: Chair, Cuff Size: Adult Regular)   Pulse 69   Wt 81.6 kg (180 lb)   SpO2 98%   BMI 26.58 kg/m    GENERAL APPEARANCE: alert and no distress  HEENT: no icterus, no central cyanosis  LYMPH/NECK: no adenopathy, no  asymmetry, JVP not elevated  RESPIRATORY: lungs clear to auscultation - no rales, rhonchi or wheezes, no use of accessory muscles, no retractions, respirations are unlabored, normal respiratory rate  CARDIOVASCULAR: regular rhythm, normal S1, S2, no S3 or S4 and no murmur, click or rub, precordium quiet with normal PMI.  GI: soft, non tender  EXTREMITIES:edema  NEURO: alert, normal speech,and affect  SKIN: no ecchymoses, no rashes     I have reviewed the labs and personally reviewed the imaging below and made my comment in the assessment and plan.    Labs:  CBC RESULTS:   Lab Results   Component Value Date    WBC 7.7 11/08/2018    RBC 4.64 11/08/2018    HGB 13.0 11/08/2018    HCT 40.4 11/08/2018    MCV 87 11/08/2018    MCH 28.0 11/08/2018    MCHC 32.2 11/08/2018    RDW 13.1 11/08/2018     11/08/2018       BMP RESULTS:  Lab Results   Component Value Date     07/25/2023     11/08/2018    POTASSIUM 4.3 07/25/2023    POTASSIUM 4.3 11/08/2018    CHLORIDE 107 07/25/2023    CHLORIDE 105 11/08/2018    CO2 22 07/25/2023    CO2 25 11/08/2018    ANIONGAP 11 07/25/2023    ANIONGAP 8 11/08/2018     (H) 07/25/2023    GLC 84 11/08/2018    BUN 8.9 07/25/2023    BUN 20 11/08/2018    CR 0.66 07/25/2023    CR 0.63 11/08/2018    GFRESTIMATED >90 07/25/2023    GFRESTIMATED >90 11/08/2018    GFRESTBLACK >90 11/08/2018    DELANEY 9.4 07/25/2023    DELANEY 9.2 11/08/2018      ECHO (report reviewed):   Limited TTE 11/28/22  Final Conclusion Previous Study: 11-    Visually Estimated EF: 55-60%    Normal global left ventricular size, wall thickness, and ejection function with no obvious   regional wall motion/thickening abnormalities.    Normal left atrial size.    Mild aortic regurgitation.    Very small pericardial effusion seen.    Dilated IVC size, >50% collapse with respiration.      TTE 11/15/22  Final Conclusion Previous Study: 3-    Visually Estimated EF: 55-60%    Normal left ventricular size, wall  thickness, and global systolic function with no obvious   regional wall motion abnormalities.    Normal right ventricular size and function.    No significant valvular heart disease noted.    Small pericardial effusion.    Dilated IVC size, <50% collapse with respiration.    No prior studies available for comparison        Cardiac MRI 9/13/22  Final conclusions:   1. The gadolinium delay enhancement shows no scar/fibrosis/inflammation in   the ventricular myocardium.   2. The left ventricle size is normal.  The LV wall thickness is normal.     There is no LV wall motion abnormality. The LV systolic function is   normal.  Calculated LVEF is 57%.     3. The right ventricle is normal in size and wall motion. RV systolic   function is preserved (calclated RVEF 53%).   4. No significant valve dysfunction noted.   5. Pericardium is within normal limits (no significant delayed enhancement   or effusion); no evidence of constriction on real time imaging.   6. Non cardiac finding reported separately below, per Radiology.       Cardiac cath: from 9/13/2022 demonstrated   Calcium Score: 0   Left Main: 0   LAD: 0   LCX:  0   RCA: 0     Calcium Score Percentile: 0   This means that the 0% of asymptomatic people of the same age, gender, and   race will have a lower calcium score.           Impression and plan:    #COVID infection x3 (none of the injections required hospitalization) last COVID infection a year ago.    #Post COVID chronic anxiety  #POST COVID POTS likely resolved now  -After last COVID infection a year ago patient had severe POTS symptoms.  So far cardiac work-up has been unremarkable.  POTS symptoms gradually improved over time.  Currently she has no symptoms suggestive of POTS.  Very rarely she reports feeling palpitations that brings on shortness of breath.  I have discussed with her that she can take extra metoprolol 12.5 mg in the morning to see if that helps.  I suggested a repeat Zio patch but the patient  declined.  She is very educated about hydration, salt and physical activity.  She recently started walking half an hour and she is feeling well with that.  Her physical exam is unremarkable.  I have discussed with the patient that she no longer has POTS.  No concernin cardiac symptoms at this time.    Plan:  Continue metoprolol 12.5 mg in the evening.  I suggested her to take extra 12.5 mg in the morning to see if that helps with her occasional palpitations that brings on shortness of breath.  I do not think she needs cardiac work-up at this time given normal extensive cardiac work-up until now.  I have mentioned this to the patient.  She does not want to do Zio patch at this time.  If she has further palpitations we can consider this.    I have discussed with the patient that she can stay on metoprolol 12.5 MG BID for 6 months or so and then she can reduce the dose to once a day and then gradually to every other day and she can gradually stop metoprolol if she feels well off of it.    Given urinary urgency/incontinence I will refer the patient to urology.  Follow-up with cardiology as needed.  I also discussed with her that she does not need aspirin.  Patient has significant side effects with propranolol and she is not taking it.    I am highly suspecting that few seconds lasting shortness of breath that she describes which is nonexertional most of the time is likely related to anxiety/stress (relieved with cold water).  She was given Zoloft but she declines to use medications.    Return to cardiology as needed.     Total time spent today for this visit is 90 minutes including precharting, face-to-face clinic visit, review of labs/imaging and medical documentation.    Wilder ARZATE MD  [facility] Division of Cardiology  Pager [phone]       Please do not hesitate to contact me if you have any questions/concerns.     Sincerely,     Wilder Arzate MD

## 2023-10-23 NOTE — NURSING NOTE
Chief Complaint   Patient presents with    New Patient     POTS assessment       Vitals were taken and medications reconciled.    Gilbert Rolon, EMT  11:33 AM

## 2024-02-23 ENCOUNTER — OFFICE VISIT (OUTPATIENT)
Dept: ENDOCRINOLOGY | Facility: CLINIC | Age: 44
End: 2024-02-23
Payer: COMMERCIAL

## 2024-02-23 VITALS
WEIGHT: 184 LBS | OXYGEN SATURATION: 99 % | SYSTOLIC BLOOD PRESSURE: 115 MMHG | DIASTOLIC BLOOD PRESSURE: 77 MMHG | BODY MASS INDEX: 27.17 KG/M2 | HEART RATE: 69 BPM

## 2024-02-23 DIAGNOSIS — U09.9 LONG COVID: ICD-10-CM

## 2024-02-23 DIAGNOSIS — E23.7 PITUITARY DYSFUNCTION (H): Primary | ICD-10-CM

## 2024-02-23 LAB
CORTIS SERPL-MCNC: 6 UG/DL
ESTRADIOL SERPL-MCNC: 110 PG/ML
FSH SERPL IRP2-ACNC: 10.9 MIU/ML
LH SERPL-ACNC: 7.5 MIU/ML
PROLACTIN SERPL 3RD IS-MCNC: 12 NG/ML (ref 5–23)
SHBG SERPL-SCNC: 80 NMOL/L (ref 30–135)
T3 SERPL-MCNC: 107 NG/DL (ref 85–202)
T4 FREE SERPL-MCNC: 1.25 NG/DL (ref 0.9–1.7)
TSH SERPL DL<=0.005 MIU/L-ACNC: 1.21 UIU/ML (ref 0.3–4.2)

## 2024-02-23 PROCEDURE — 82670 ASSAY OF TOTAL ESTRADIOL: CPT | Performed by: INTERNAL MEDICINE

## 2024-02-23 PROCEDURE — 84439 ASSAY OF FREE THYROXINE: CPT | Performed by: INTERNAL MEDICINE

## 2024-02-23 PROCEDURE — 82088 ASSAY OF ALDOSTERONE: CPT | Performed by: INTERNAL MEDICINE

## 2024-02-23 PROCEDURE — 84305 ASSAY OF SOMATOMEDIN: CPT | Mod: 90 | Performed by: INTERNAL MEDICINE

## 2024-02-23 PROCEDURE — 99213 OFFICE O/P EST LOW 20 MIN: CPT | Performed by: INTERNAL MEDICINE

## 2024-02-23 PROCEDURE — 83001 ASSAY OF GONADOTROPIN (FSH): CPT | Performed by: INTERNAL MEDICINE

## 2024-02-23 PROCEDURE — 84270 ASSAY OF SEX HORMONE GLOBUL: CPT | Performed by: INTERNAL MEDICINE

## 2024-02-23 PROCEDURE — 84480 ASSAY TRIIODOTHYRONINE (T3): CPT | Performed by: INTERNAL MEDICINE

## 2024-02-23 PROCEDURE — 82533 TOTAL CORTISOL: CPT | Performed by: INTERNAL MEDICINE

## 2024-02-23 PROCEDURE — 84146 ASSAY OF PROLACTIN: CPT | Performed by: INTERNAL MEDICINE

## 2024-02-23 PROCEDURE — 82024 ASSAY OF ACTH: CPT | Performed by: INTERNAL MEDICINE

## 2024-02-23 PROCEDURE — 99000 SPECIMEN HANDLING OFFICE-LAB: CPT | Performed by: INTERNAL MEDICINE

## 2024-02-23 PROCEDURE — 36415 COLL VENOUS BLD VENIPUNCTURE: CPT | Performed by: INTERNAL MEDICINE

## 2024-02-23 PROCEDURE — 84443 ASSAY THYROID STIM HORMONE: CPT | Performed by: INTERNAL MEDICINE

## 2024-02-23 PROCEDURE — 83002 ASSAY OF GONADOTROPIN (LH): CPT | Performed by: INTERNAL MEDICINE

## 2024-02-23 PROCEDURE — 84244 ASSAY OF RENIN: CPT | Mod: 90 | Performed by: INTERNAL MEDICINE

## 2024-02-23 ASSESSMENT — PAIN SCALES - GENERAL: PAINLEVEL: MODERATE PAIN (5)

## 2024-02-23 NOTE — PROGRESS NOTES
- Endocrinology Follow up -    Reason for visit/consult:  Long COVID, excessive fatigue    Primary care provider: [facility] - [facility]      Assessment and Plan  44 year old female with POTS and challnging long COVID    COVID 8/2022 1 months later severe symptoms of long COVID and persisted and diagnosed POTS    Second COVID 12/2023 was mild    Never had vaccination     - She is interested in long COVID endocrine study.   Which includes pituitary endocrine hormone panel    (cortisol, ACTH, IGF1, PRL, TSH, free T4, LH, FSH, estradiol).       - also renin, aldosterone    POTS  Her palpitation has been improving.       Total 30 minutes spent on the date of the encounter doing chart review, history and exam, documentation and further activities as noted above.    The longitudinal plan of care for the diagnosis(es)/condition(s) as documented were addressed during this visit. Due to the added complexity in care, I will continue to support Maria Esther in the subsequent management and with ongoing continuity of care.     Yaquelin Craig MD  Staff Physician  Endocrinology and Metabolism  License: [id number]    Interval History as of 2/23/2024 : Patient has been doing well. Medication compliance good for cardiac meds  . New event includes : palpitation improving.  .   HPI: A 42 yo female here for the endocrine evaluation of long COVID condition. Referral from Dr. Escalante, post COVID clinic [facility].   Patient has been healthy, no major medical condition prior to 8/2022 when she had COVID infection. Her symptoms was mild with mild fever and chills for a few days, then she repeated same symptoms 10 days after. Then she was ok for 1 month or so, then she developed sudden palpitation on 9/20/2022 when she was watching computer and her HR increased to 180 with SOB, her troponin was positive and hospitazed for 4 days. She was started beta blocker. After  that, she has had lung fluid almost 1 year until recently, she has been followed by cardiologist.   At present, she still has frequent chest pain, dizziness and fainting. She was told POTS.   Meanwhile, she gained 17 lb, and weakness persisted. Her quality of life deteriorated, she cannot travel any more, she is able to work (she is an ) but occaisionally she needs to stop due to chest pain. WIth BP med, her HR is currently undercotnrolled 70s.  She has also experiences polyuria.       Past Medical/Surgical History:  Past Medical History:   Diagnosis Date    POTS (postural orthostatic tachycardia syndrome)      Past Surgical History:   Procedure Laterality Date    APPENDECTOMY  06/2014    AS HYSTEROSCOPY W ENDOMETRIAL BX/POLYPECTOMY W/WO D&C N/A 03/2018       Allergies:  Allergies   Allergen Reactions    Contrast Dye     No Clinical Screening - See Comments Other (See Comments)     LW Other1: -seasonal    Diatrizoate Itching     Medicated with 50mg IV Benadryl on 2- and pt sneezed three times after injection but said it was much better then last time.  Jessica Pepper ....................  2/27/2015   4:02 PM       Current Medications   Current Outpatient Medications   Medication    acetaminophen (TYLENOL) 500 MG tablet    ibuprofen (ADVIL/MOTRIN) 800 MG tablet    meloxicam (MOBIC) 15 MG tablet    metoprolol succinate ER (TOPROL XL) 25 MG 24 hr tablet     No current facility-administered medications for this visit.       Family History:  No family history on file.    Social History:  Social History     Tobacco Use    Smoking status: Never    Smokeless tobacco: Never   Substance Use Topics    Alcohol use: No       ROS:  Full review of systems taken with the help of the intake sheet. Otherwise a complete 14 point review of systems was taken and is negative unless stated in the history above.      Physical Exam:   Vitals: There were no vitals taken for this visit.  BMI= There is no height or weight on  file to calculate BMI.   General: well appearing, no acute distress, pleasant and conversant,   Mental Status/neuro: alert and oriented  Face: symmetrical, normal facial color  Eyes: anicteric, no proptosis or lid lag  Resp: normally breathing    Labs : I reviewed data from epic and extract and summarize the pertinent data here.   Lab Results   Component Value Date     11/08/2018      Lab Results   Component Value Date    POTASSIUM 4.3 11/08/2018     Lab Results   Component Value Date    CHLORIDE 105 11/08/2018     Lab Results   Component Value Date    DELANEY 9.2 11/08/2018     Lab Results   Component Value Date    CO2 25 11/08/2018     Lab Results   Component Value Date    BUN 20 11/08/2018     Lab Results   Component Value Date    CR 0.63 11/08/2018     Lab Results   Component Value Date    GLC 84 11/08/2018     Lab Results   Component Value Date    TSH 0.98 11/08/2018     No results found for: T4  No results found for: A1C    No results found for: IGF1  No results found for: LH  No results found for: FSH  No results found for: ESTROGEN  No results found for: PROLACTIN        MRI Brain: I personally reviewed the original images and agree with the below reports.   No results found for this or any previous visit.

## 2024-02-23 NOTE — NURSING NOTE
Maria Esther Hendrickson's goals for this visit include:   Chief Complaint   Patient presents with    Follow Up    Endocrine Problem     Long Covid     She requests these members of her care team be copied on today's visit information: Yes     PCP: [facility]    Referring Provider:  No referring provider defined for this encounter.    /77 (BP Location: Left arm, Patient Position: Sitting, Cuff Size: Adult Large)   Pulse 69   Wt 83.5 kg (184 lb)   LMP 02/18/2024   SpO2 99%   BMI 27.17 kg/m      Do you need any medication refills at today's visit? No    Yanna Mitchell CMA  Adult Endocrinology   [facility]

## 2024-02-23 NOTE — PATIENT INSTRUCTIONS
Welcome to the [facility] Endocrinology and Diabetes Clinics     Our Endocrinology Clinics are here to provide you with a team-based, collaborative approach in the diagnosis and treatment of patients with diabetes and endocrine disorders. The team is made up of Physicians, Physician Assistants, Certified Diabetes Educators, Registered Nurses, Medical Assistants, Emergency Medical Technicians, and many others, all of whom have the unified goal of providing our patients with high quality care.     Please see below for some helpful tips to best navigate and use the [facility] Endocrinology clinic:     [place] Respect: At [facility], we are committed to a respectful and safe space for all patients, visitors, and staff.  We believe that mutual respect between patients and their care team is the foundation of quality care.  It is our expectation that you will be treated with respect by your care team.  In turn, we ask that all communication with the care team (written and verbal) be respectful and free from profanity, threatening, or abusive language.  Disrespectful communication undermines our therapeutic relationship with you and may result in us being unable to continue to provide your care.    Refills: A provider must see you at least annually to prescribe and refill medications. This is to ensure your safety as well as meet insurance and compliance regulations.    Scheduling: Many of our Providers offer both in-person or video visits. Please call to schedule any needed follow ups as soon as possible because our provider schedules fill up very quickly. Our care team has the right to require an in-person visit when they believe that it is medically necessary. Please remember that for any virtual visits, you must be in the [facility] at the time of the visit, otherwise we are unable to see you and you will need to be rescheduled.    Missed Appointments: If you need to cancel or miss your  scheduled appointment, please call the clinic at [phone] to reschedule.  Please note if you repeatedly miss appointments or repeatedly miss appointments without calling to inform us ahead of time (no-show), the clinic may elect to not allow you to reschedule without speaking to a manager, may require a Partnership In Care Agreement prior to rescheduling, or could result in you no longer being able to receive care from the clinic. Providing the clinic with timely notification if you have to miss an appointment, allows us to better serve the needs of all of our patients.    Primary Care Provider: Our Endocrinologists are Specialists in their field. We expect you to have a Primary Care Provider established to handle any needs outside of your diabetes and endocrine care.  We would be happy to assist you find a Primary Care Provider, if you do not have one.    Ranberry: Ranberry is a wonderful resource that allows you access to your Care Team via online or the smita. Please ask a member of the team if you would like help creating an account. Please note that it may take up to 2 business days for a response. Ranberry messages are not reviewed on weekends or after business hours.  Emergent or urgent care needs should never be communicated via Ranberry.  If you experience a medical emergency call 911 or go to the nearest emergency room.    Labs: It is recommended that you stay within the [facility] for labs but you are welcome to obtain ordered labs (with some exceptions) from any location of your choice as long as they are able to complete and process the needed labs. If you need us to fax orders to your preferred lab, please provide us the name and fax number of the lab you would like to go to so we can fax the orders. If your labs are drawn outside of the [facility], please have them fax the results to [phone] ([place]) or [phone] ([place]) or via [facility]. It is your  responsibility to ensure that outside lab results are sent to us.    We look forward to working with you. Please do not hesitate to reach out with any questions.    Thank you,    The Endocrine Team    [facility] Address:   [place] Address:     [address] [place] [address]    Phone: [phone]  Fax: [phone] [address] [place] [address]    Phone: [phone]  Fax: [phone]     [facility] Cost Estimate Phone Number: [phone]    General Lab and Imaging Scheduling Phone Number: [phone]

## 2024-02-23 NOTE — LETTER
2/23/2024         RE: Maria Esther Hendrickson  [address] [place] [address]        Dear Colleague,    Thank you for referring your patient, Maria Esther Hendrickson, to the [facility]. Please see a copy of my visit note below.                                                                                   - Endocrinology Follow up -    Reason for visit/consult:  Long COVID, excessive fatigue    Primary care provider: [facility] - [facility]      Assessment and Plan  44 year old female with POTS and challnging long COVID    COVID 8/2022 1 months later severe symptoms of long COVID and persisted and diagnosed POTS    Second COVID 12/2023 was mild    Never had vaccination     - She is interested in long COVID endocrine study.   Which includes pituitary endocrine hormone panel    (cortisol, ACTH, IGF1, PRL, TSH, free T4, LH, FSH, estradiol).       - also renin, aldosterone    POTS  Her palpitation has been improving.       Total 30 minutes spent on the date of the encounter doing chart review, history and exam, documentation and further activities as noted above.    The longitudinal plan of care for the diagnosis(es)/condition(s) as documented were addressed during this visit. Due to the added complexity in care, I will continue to support Maria Esther in the subsequent management and with ongoing continuity of care.     Yaquelin Craig MD  Staff Physician  Endocrinology and Metabolism  License: [place] [id number]    Interval History as of 2/23/2024 : Patient has been doing well. Medication compliance good for cardiac meds  . New event includes : palpitation improving.  .   HPI: A 42 yo female here for the endocrine evaluation of long COVID condition. Referral from Dr. Escalante, post COVID clinic [facility].   Patient has been healthy, no major medical condition prior to 8/2022 when she had COVID infection. Her symptoms was mild with mild fever and chills for a few days, then she repeated same symptoms 10 days  after. Then she was ok for 1 month or so, then she developed sudden palpitation on 9/20/2022 when she was watching computer and her HR increased to 180 with SOB, her troponin was positive and hospitazed for 4 days. She was started beta blocker. After that, she has had lung fluid almost 1 year until recently, she has been followed by cardiologist.   At present, she still has frequent chest pain, dizziness and fainting. She was told POTS.   Meanwhile, she gained 17 lb, and weakness persisted. Her quality of life deteriorated, she cannot travel any more, she is able to work (she is an ) but occaisionally she needs to stop due to chest pain. WIth BP med, her HR is currently undercotnrolled 70s.  She has also experiences polyuria.       Past Medical/Surgical History:  Past Medical History:   Diagnosis Date     POTS (postural orthostatic tachycardia syndrome)      Past Surgical History:   Procedure Laterality Date     APPENDECTOMY  06/2014     AS HYSTEROSCOPY W ENDOMETRIAL BX/POLYPECTOMY W/WO D&C N/A 03/2018       Allergies:  Allergies   Allergen Reactions     Contrast Dye      No Clinical Screening - See Comments Other (See Comments)     LW Other1: -seasonal     Diatrizoate Itching     Medicated with 50mg IV Benadryl on 2- and pt sneezed three times after injection but said it was much better then last time.  Jessica Pepper ....................  2/27/2015   4:02 PM       Current Medications   Current Outpatient Medications   Medication     acetaminophen (TYLENOL) 500 MG tablet     ibuprofen (ADVIL/MOTRIN) 800 MG tablet     meloxicam (MOBIC) 15 MG tablet     metoprolol succinate ER (TOPROL XL) 25 MG 24 hr tablet     No current facility-administered medications for this visit.       Family History:  No family history on file.    Social History:  Social History     Tobacco Use     Smoking status: Never     Smokeless tobacco: Never   Substance Use Topics     Alcohol use: No       ROS:  Full review of systems  taken with the help of the intake sheet. Otherwise a complete 14 point review of systems was taken and is negative unless stated in the history above.      Physical Exam:   Vitals: There were no vitals taken for this visit.  BMI= There is no height or weight on file to calculate BMI.   General: well appearing, no acute distress, pleasant and conversant,   Mental Status/neuro: alert and oriented  Face: symmetrical, normal facial color  Eyes: anicteric, no proptosis or lid lag  Resp: normally breathing    Labs : I reviewed data from epic and extract and summarize the pertinent data here.   Lab Results   Component Value Date     11/08/2018      Lab Results   Component Value Date    POTASSIUM 4.3 11/08/2018     Lab Results   Component Value Date    CHLORIDE 105 11/08/2018     Lab Results   Component Value Date    DELANEY 9.2 11/08/2018     Lab Results   Component Value Date    CO2 25 11/08/2018     Lab Results   Component Value Date    BUN 20 11/08/2018     Lab Results   Component Value Date    CR 0.63 11/08/2018     Lab Results   Component Value Date    GLC 84 11/08/2018     Lab Results   Component Value Date    TSH 0.98 11/08/2018     No results found for: T4  No results found for: A1C    No results found for: IGF1  No results found for: LH  No results found for: FSH  No results found for: ESTROGEN  No results found for: PROLACTIN        MRI Brain: I personally reviewed the original images and agree with the below reports.   No results found for this or any previous visit.        Again, thank you for allowing me to participate in the care of your patient.        Sincerely,        Yaquelin Craig MD

## 2024-02-26 LAB
ACTH PLAS-MCNC: 30 PG/ML
RENIN PLAS-CCNC: 0.8 NG/ML/HR

## 2024-02-27 LAB — ALDOST SERPL-MCNC: 9.2 NG/DL (ref 0–31)

## 2024-03-01 LAB
INSULIN GROWTH FACTOR 1 (EXTERNAL): 99 NG/ML (ref 52–328)
INSULIN GROWTH FACTOR I SD SCORE (EXTERNAL): -0.8 SD

## 2024-05-16 ENCOUNTER — OFFICE VISIT (OUTPATIENT)
Dept: PLASTIC SURGERY | Facility: CLINIC | Age: 44
End: 2024-05-16

## 2024-05-16 DIAGNOSIS — M95.0 ACQUIRED DEFORMITY OF NOSE: Primary | ICD-10-CM

## 2024-05-16 NOTE — PROGRESS NOTES
Cl Azakimberly following her rhinoplasty of several years ago.  She has noted some deformities of the nasal tip over the last year or so.  I performed a rhinoplasty in 2018 and have not seen her for several years.  She notes a prominence more on the right nasal tip than the left.  I did inject steroids in the past to decrease some edema but the fullness that she has now is more cartilaginous in nature.  She has an adequate nasal airway.  I could consider a trans vestibular trimming of some of the cartilage and perhaps more sliding cartilage replacing it and a subtle depression at the radix I would take out more on the right than the left.  Unfortunately she has developed POTS after she had COVID.  She still is considerably fatigue.  I would like that to be improved maximally before we undertake surgery in the nose relatively minor procedure.  Given some anxiety issues I think should be better off having the surgery at the surgery center would take about 45 minutes to do.  I would charge her minimal surgeons fee for this as she is a well-established patient in my practice over the years.  Imperfections were reviewed with her again.TYRA NORMAN MD

## 2024-05-16 NOTE — LETTER
5/16/2024       RE: Maria Esther Hendrickson  [address] [place] [address]       Dear Colleague,    Thank you for referring your patient, Maria Esther Hendrickson, to the  [facility] at [facility]. Please see a copy of my visit note below.    [address] Azan following her rhinoplasty of several years ago.  She has noted some deformities of the nasal tip over the last year or so.  I performed a rhinoplasty in 2018 and have not seen her for several years.  She notes a prominence more on the right nasal tip than the left.  I did inject steroids in the past to decrease some edema but the fullness that she has now is more cartilaginous in nature.  She has an adequate nasal airway.  I could consider a trans vestibular trimming of some of the cartilage and perhaps more sliding cartilage replacing it and a subtle depression at the radix I would take out more on the right than the left.  Unfortunately she has developed POTS after she had COVID.  She still is considerably fatigue.  I would like that to be improved maximally before we undertake surgery in the nose relatively minor procedure.  Given some anxiety issues I think should be better off having the surgery at the surgery center would take about 45 minutes to do.  I would charge her minimal surgeons fee for this as she is a well-established patient in my practice over the years.  Imperfections were reviewed with her again.      Again, thank you for allowing me to participate in the care of your patient.      Sincerely,    TYRA NORMAN MD

## 2024-05-16 NOTE — LETTER
May 16, 2024  Re: Maria Esther Hendrickson  1980      Dear Dr. Lam,    Thank you so much for referring Maria Esther Hendrickson to the [facility]. I had the pleasure of visiting with Maria Esther today.     Attached you will find a copy of my note. Please feel free to reach out to me with any questions, [phone].     90 Mitzyn following her rhinoplasty of several years ago.  She has noted some deformities of the nasal tip over the last year or so.  I performed a rhinoplasty in 2018 and have not seen her for several years.  She notes a prominence more on the right nasal tip than the left.  I did inject steroids in the past to decrease some edema but the fullness that she has now is more cartilaginous in nature.  She has an adequate nasal airway.  I could consider a trans vestibular trimming of some of the cartilage and perhaps more sliding cartilage replacing it and a subtle depression at the radix I would take out more on the right than the left.  Unfortunately she has developed POTS after she had COVID.  She still is considerably fatigue.  I would like that to be improved maximally before we undertake surgery in the nose relatively minor procedure.  Given some anxiety issues I think should be better off having the surgery at the surgery center would take about 45 minutes to do.  I would charge her minimal surgeons fee for this as she is a well-established patient in my practice over the years.  Imperfections were reviewed with her again.      Your trust in our practice and care is much appreciated.    Sincerely,    TYRA NORMAN MD

## 2024-09-17 NOTE — NURSING NOTE
Chief Complaint   Patient presents with     Follow Up    David MARTINEZ   Pt is requesting to have blood tests and needs lab orders.  Pt claims he was told by Dr Botello order will be in system.  Please call pt and advise.  Thank you.

## 2025-03-21 ENCOUNTER — ORDERS ONLY (AUTO-RELEASED) (OUTPATIENT)
Dept: FAMILY MEDICINE | Facility: CLINIC | Age: 45
End: 2025-03-21

## 2025-03-21 DIAGNOSIS — Z12.11 SCREEN FOR COLON CANCER: ICD-10-CM

## 2025-03-21 PROBLEM — R07.89 NON-CARDIAC CHEST PAIN: Status: ACTIVE | Noted: 2023-11-29

## 2025-03-21 PROBLEM — G90.1 DYSAUTONOMIA (H): Status: ACTIVE | Noted: 2024-01-09

## 2025-03-21 PROBLEM — R00.2 PALPITATIONS: Status: ACTIVE | Noted: 2023-11-29

## 2025-03-21 PROBLEM — M54.16 LUMBAR RADICULOPATHY: Status: ACTIVE | Noted: 2017-08-10

## 2025-03-24 ENCOUNTER — TELEPHONE (OUTPATIENT)
Dept: FAMILY MEDICINE | Facility: CLINIC | Age: 45
End: 2025-03-24
Payer: COMMERCIAL

## 2025-03-24 DIAGNOSIS — G90.A POTS (POSTURAL ORTHOSTATIC TACHYCARDIA SYNDROME): Primary | ICD-10-CM

## 2025-03-24 NOTE — TELEPHONE ENCOUNTER
Pt calling    She states that the referral that was sent for POTS on 3/21/25 needs to be resent as an   Order not a referral for    -Adult Autonomic reflex screen-     Unsure the reason for this, the specialist should be ordering specific screening.  Please call their office to find out how this patient can be seen with them    Please call pt back once order/ referral has been figured out.    [facility]  Dr. ERIK NUNEZ     [phone]        Tala, RN    Triage Nurse  [facility]

## 2025-03-24 NOTE — TELEPHONE ENCOUNTER
I called and spoke to  who says they don't have any neurologists that consult for autonomic dysfunction/POTS but they do have Dr. Jauregui who administers the autonomic testing if ordered by the PCP, results/interpretation are then sent back to PCP.   The neurologist does not advise on what to do about it or the plan for the issue.    If provider wants patient to consult with neurology and not just get tested, they advise outside neurology clinics:    [facility] or [facility]    Routed to provider, Dr. Loera to address; put in order for:  Adult Autonomic reflex screen?   Or refer to outside neurology?    Chelsi FUNEZ RN  [facility] Triage

## 2025-03-25 NOTE — TELEPHONE ENCOUNTER
Dr Marla Loera, can you edit the referral and place it for :  Adult Autonomic reflex screen (VS the autonomic dysfunction?)     I will then call the patient to update her.     Ally Jeong RN on 3/25/2025 at 11:07 AM

## 2025-03-25 NOTE — TELEPHONE ENCOUNTER
Called patient, and she stated that she needs this for screening, below.    Adult Autonomic reflex screen                                                                       Tiffany BSN   Triage Nurse RN  [facility]

## 2025-03-25 NOTE — TELEPHONE ENCOUNTER
Before I make any changes to the referral, we need to confirm that she is seeking screening rather than treatment. Based on our conversation during her appointment, she was looking for treatment.

## 2025-03-25 NOTE — TELEPHONE ENCOUNTER
Called and notified patient.    Please fax orders to appropriate place below.      Tiffany BSN   Triage Nurse RN  [facility]

## 2025-04-22 NOTE — TELEPHONE ENCOUNTER
Action 4/22/25 MV   Action Taken Imaging request faxed to [facility]     Action 4/24/25 MV   Action Taken Images resolved       RECORDS RECEIVED FROM: internal   REASON FOR VISIT:    PROVIDER: Dr. Jauregui   DATE OF APPT: 4/30/25   NOTES (FOR ALL VISITS) STATUS DETAILS   OFFICE NOTE from referring provider Internal Dr Jordyn Marquez @ [facility]:  3/21/25   OFFICE NOTE from other specialist Care Everywhere Dr Diallo Miller @ [facility]:  9/25/24   DISCHARGE REPORT from the ER Care Everywhere [facility]:  6/7/24 6/1/24   MEDICATION LIST Internal    IMAGING  (FOR ALL VISITS)     MRI (HEAD, NECK, SPINE) PACS [facility]:  MRI Brain 6/7/24   CT (HEAD, NECK, SPINE) PACS [facility]:  CT Head 3/10/25  CT Head 6/1/24

## 2025-04-26 ENCOUNTER — HEALTH MAINTENANCE LETTER (OUTPATIENT)
Age: 45
End: 2025-04-26

## 2025-04-30 ENCOUNTER — PRE VISIT (OUTPATIENT)
Dept: NEUROLOGY | Facility: CLINIC | Age: 45
End: 2025-04-30

## 2025-04-30 ENCOUNTER — OFFICE VISIT (OUTPATIENT)
Dept: NEUROLOGY | Facility: CLINIC | Age: 45
End: 2025-04-30
Payer: COMMERCIAL

## 2025-04-30 VITALS
SYSTOLIC BLOOD PRESSURE: 123 MMHG | DIASTOLIC BLOOD PRESSURE: 80 MMHG | OXYGEN SATURATION: 99 % | HEART RATE: 71 BPM | RESPIRATION RATE: 16 BRPM

## 2025-04-30 DIAGNOSIS — R53.81 PHYSICAL DECONDITIONING: ICD-10-CM

## 2025-04-30 DIAGNOSIS — G90.A POTS (POSTURAL ORTHOSTATIC TACHYCARDIA SYNDROME): Primary | ICD-10-CM

## 2025-04-30 ASSESSMENT — PAIN SCALES - GENERAL: PAINLEVEL_OUTOF10: NO PAIN (0)

## 2025-04-30 NOTE — PROGRESS NOTES
CHIEF COMPLAINT / REASON FOR VISIT  Episodes of palpitations, chest tightness, and dizziness    Referred by Dr. Cobb    HISTORY OF PRESENT ILLNESS   is a 45 year old female presenting for evaluation of episodes of palpitations, chest tightness, and dizziness. Patient is here by herself today.     Symptoms started after COVID infection in August 2022. Prior to this, she was overall healthy and physically active. She had fever and URI symptoms for about 2 weeks then had a relapse of fever and was prescribed oral steroid. Over the following weeks, she developed multiple episodes of palpitations, chest tightness, and lightheadedness. She was hospitalized. Cardiac work-up was reportedly unremarkable and she was discharge don metoprolol. Around January 2023, she was found to have small pericardial effusion and was treated with colchicine with gradual resolution of pericardial effusion after 6 months. However, she continues to have frequent episodes of palpitations, chest tightness, and dizziness/lightheadedness. The episodes can occur while standing, sitting, or even in her sleep. This has resulted in multiple ED visits. The episodes typically last for 10-15 minutes then spontaneously resolved. By the time she reached the ED, her heart rate typically had already normalized. She could not identify any clear trigger of these episodes but feels that increasing water and salt intake have make them less frequent. She noticed that the lightheadedness typically occur when her blood pressures are on the lower side (90/60). She tried to reduce the dose of metoprolol and eventually stop it a few weeks ago. She felt that lightheadedness and dizziness have improved after stopping metoprolol. Fatigue has also improved.     In the past few weeks, she had a brief episodes of chest tightness and palpitations about every other day, which have been less frequent and less severe from before. She started to exercise on the  treadmill again. She is able to walk 30-45 minutes on treadmill but still struggle to do stationary bike (which she was able to do prior to COVID).     She denies persistent numbness or tingling sensation in extremities. No change in ability to sweat. She has chronic constipation (about 2 bowel movements/ week). She has modified her diet and lost a few pounds.     Ivabradine was prescribed at one point but she has never tired it. She takes flupentixol and melitracen (prescribed from [place]) as needed to help her sleep (about 3-4 times/month).     REVIEW OF SYSTEMS  All negative except for what indicated in the HPI. The following portions of the patient's history were reviewed and updated as appropriate: allergies, current medications, family history, medical history, surgical history, social history, and problem list.     PAST MEDICAL/SURGICAL HISTORY   No known PMH    MEDICATIONS    Current Outpatient Medications:     acetaminophen (TYLENOL) 500 MG tablet, Take 500 mg by mouth., Disp: , Rfl:     metoprolol succinate ER (TOPROL XL) 25 MG 24 hr tablet, Take 1 tablet by mouth daily (Patient not taking: Reported on 4/30/2025), Disp: , Rfl:     ALLERGIES:  Allergies   Allergen Reactions    No Clinical Screening - See Comments Other (See Comments)     LW Other1: -seasonal    Contrast Dye Itching and Unknown     Other Reaction(s): Unknown    05/09/2024: Patient premedicated with the 24 hour prednisone protocol. After contrast administration, she sneezed multiple times, tachycardic, SOB. IR nurse and radiologist were called to the room. Per Dr. Florentino(R radiologist) since she has breakthrough reactions, this patient should not have CT contrast dye administered.      Medicated with 50mg IV Benadryl on 2- and pt sneezed three times after injection but said it was much better then last time.  Jessica SOLIS Shantelle 2/27/2015   4:02 PM    Medicated with 50mg IV Benadryl on 2- and pt sneezed three times after injection  "but said it was much better then last time.    Diatrizoate Itching     Medicated with 50mg IV Benadryl on 2- and pt sneezed three times after injection but said it was much better then last time.  Jessica Pepper ....................  2/27/2015   4:02 PM    Sertraline Palpitations       PHYSICAL EXAM  /80 (BP Location: Right arm, Patient Position: Sitting, Cuff Size: Adult Regular)   Pulse 71   Resp 16   LMP 03/01/2025   SpO2 99%     Bedside BP and HR (no betablocker for 1 week)  Supine   BP  130/81 HR 69  Stand 30 sec  /87 HR 92 Lightheaded  Stand 1 min  /87 HR 96  \"  Stand 3 min  /88 HR 97  \"    NEUROLOGICAL EXAMINATION  Mental status: normal.  Speech: normal.  Muscle strength: Normal muscle strength 5/5 proximally and distally in upper and lower limbs.  Sensation: Mildly reduced cold sensation in the feet. Intact sensation to light touch, pinprick, vibration in upper and lower limbs.  Deep tendon reflexes: Normal reflexes in the upper and lower limbs including intact bilateral ankle reflexes  High arched feet: Absent  Hammertoes: Absent  Coordination: normal rapid alternating movements and finger to nose testing  Gait: normal.  Walk on heels: yes, bilaterally.  Walk on toes: yes, bilaterally.    Getting up from seated position without pushing on chair: yes.  Posture: normal.  Romberg: negative.    ARS 4/14/2025: There is distal postganglionic sympathetic sudomotor impairment with normal cardiovagal and cardiovascular adrenergic function. There is no objective evidence of orthostatic tachycardia but she took metoprolol succinate less than 24 hour prior to the test.     ASSESSMENT / PLAN   #1 Orthostatic intolerance/POTS (post viral)    Ms. Hendrickson's reported symptoms could fit with orthostatic intolerance. Tilt table and bedside BP and HR showed a tendency to develop symptomatic orthostatic tachycardia/POTS. Contributing factors to her symptoms include post viral/COVID infection, " physical deconditioning, and a component of anxiety/hyperventilation. The distal postganglionic sympathetic sudomotor impairment noted on ARS would not explain her symptom and is of uncertain clinical significance. She does not have sensory symptoms or objective evidence of small fiber neuropathy on exam today. We discussed that there is currently no indication for immunotherapy. She has already started several appropriate non-pharmacologic approaches with improvement of her symptoms (less frequent and less severe episodes). I agree with discontinuing metoprolol. After discussion, she would like to try managing her symptoms without medication first. If symptoms continue to be bothersome, we can consider a trial of ivabradine. We discussed several non-pharmacologic approaches to minimize her OI symptoms as listed below.     1) Recognize that orthostatic intolerance will vary depending on a number of factors, including volume status, ambient temperature, and physical activity (physical conditioning).   Therefore, lifestyle modifications include  Avoid exposure to heat (hot shower, hot tub, etc.)  Exercise regularly  Eat small, frequent meals (rather than large meals)  Avoid alcohol    2) Increase daily intake of water and sodium: Every day  Drink 1.5 to 2.5 liters of water or other non-caffeinated liquids (eight 8-ounce glasses)  Drink at least 1 glass of water or fluids with each meal  Drink at least 1 glass at two other times  AND  Increase sodium intake  This must be determined according to the specific circumstances  In the absence of heart failure or kidney disease  Take at least 5 grams of salt (2 grams of sodium) and up to 10-20 grams of salt (4-8 grams of sodium) daily ; table salt is about 40% sodium  1 teaspoon of salt = 5.9 grams of salt = 2.3 grams of sodium   Thus:  take at least 1 tsp and up to 4 tsp of salt daily  Foods high in sodium include salted nuts and pumpkin seeds, olives, pickles, canned soups,  ham, coleman, and additives such as soy sauce  Over-the-counter sodium chloride salt tablets (1 gram) may be used but can produce stomach upset.   A good alternative is Thermotabs (available over the counter), which are buffered salt tablets. Each tablet contains 180 mg of sodium and 15 mg of potassium. Two or 3 tablets twice a day (with breakfast and lunch) provide 720 mg to 1080 mg sodium and 60 to 90 mg of potassium daily and are easier on the stomach.  Increase the amount of potassium in your diet with fruit (especially bananas), vegetables, and potatoes     IMPORTANT: In the presence of heart or kidney disease the water and sodium intake have to be adjusted under the supervision of the local provider    3.) Wear compression garments: While up during the day  Put on before getting out of bed and take off when lying down  Wear an abdominal binder or  wear waist-high or thigh-high compression stockings or both  Spandex-type compression shirt and shorts sized to fit snugly may be more comfortable during exercise    4.)  Postural maneuvers: Perform as soon as you become symptomatic  Sybil abdominal, buttock, and thigh muscles for 30 seconds  Crossing legs  Bending forward at the waist  Rising on toes  Slow marching in pace  Squatting     5.) Exercise regularly: Avoid hot, humid environments ([facility] protocol was given to patient)  Cardio  Focus on resistance more than endurance training  Recumbent biking, swimming, rowing  5-6 days per week, 30-60 minutes per day  AND  Weights  Avoid heavy weights or sustained lifts  Leg presses, toe presses, leg extensions, leg curls  2 sets of 12 repetitions per exercise  2-3 days per week    I spent a total of 75 minutes on the day of the visit for chart review, face-to-face visit, counseling/coordination of care, and documentation. Please see the note for further information on patient assessment and treatment.       PATIENT EDUCATION  Ready to learn, no apparent learning  barriers were identified; learning preferences include listening.  Explained diagnosis and treatment plan; patient expressed understanding of the content.

## 2025-04-30 NOTE — NURSING NOTE
Chief Complaint   Patient presents with    New Patient     New subspeciality Neuropathy        /80 (BP Location: Right arm, Patient Position: Sitting, Cuff Size: Adult Regular)   Pulse 71   Resp 16   LMP 03/01/2025   SpO2 99%       Holly Hackett

## 2025-04-30 NOTE — LETTER
4/30/2025       RE: Maria Esther Hendrickson  [id number] [place] [address]     Dear Colleague,    Thank you for referring your patient, Maria Esther Hendrickson, to the [facility] [place] at [facility]. Please see a copy of my visit note below.    CHIEF COMPLAINT / REASON FOR VISIT  Episodes of palpitations, chest tightness, and dizziness    Referred by Dr. Cobb    HISTORY OF PRESENT ILLNESS   is a 45 year old female presenting for evaluation of episodes of palpitations, chest tightness, and dizziness. Patient is here by herself today.     Symptoms started after COVID infection in August 2022. Prior to this, she was overall healthy and physically active. She had fever and URI symptoms for about 2 weeks then had a relapse of fever and was prescribed oral steroid. Over the following weeks, she developed multiple episodes of palpitations, chest tightness, and lightheadedness. She was hospitalized. Cardiac work-up was reportedly unremarkable and she was discharge don metoprolol. Around January 2023, she was found to have small pericardial effusion and was treated with colchicine with gradual resolution of pericardial effusion after 6 months. However, she continues to have frequent episodes of palpitations, chest tightness, and dizziness/lightheadedness. The episodes can occur while standing, sitting, or even in her sleep. This has resulted in multiple ED visits. The episodes typically last for 10-15 minutes then spontaneously resolved. By the time she reached the ED, her heart rate typically had already normalized. She could not identify any clear trigger of these episodes but feels that increasing water and salt intake have make them less frequent. She noticed that the lightheadedness typically occur when her blood pressures are on the lower side (90/60). She tried to reduce the dose of metoprolol and eventually stop it a few weeks ago. She  felt that lightheadedness and dizziness have improved after stopping metoprolol. Fatigue has also improved.     In the past few weeks, she had a brief episodes of chest tightness and palpitations about every other day, which have been less frequent and less severe from before. She started to exercise on the treadmill again. She is able to walk 30-45 minutes on treadmill but still struggle to do stationary bike (which she was able to do prior to COVID).     She denies persistent numbness or tingling sensation in extremities. No change in ability to sweat. She has chronic constipation (about 2 bowel movements/ week). She has modified her diet and lost a few pounds.     Ivabradine was prescribed at one point but she has never tired it. She takes flupentixol and melitracen (prescribed from [place]) as needed to help her sleep (about 3-4 times/month).     REVIEW OF SYSTEMS  All negative except for what indicated in the HPI. The following portions of the patient's history were reviewed and updated as appropriate: allergies, current medications, family history, medical history, surgical history, social history, and problem list.     PAST MEDICAL/SURGICAL HISTORY   No known PMH    MEDICATIONS    Current Outpatient Medications:      acetaminophen (TYLENOL) 500 MG tablet, Take 500 mg by mouth., Disp: , Rfl:      metoprolol succinate ER (TOPROL XL) 25 MG 24 hr tablet, Take 1 tablet by mouth daily (Patient not taking: Reported on 4/30/2025), Disp: , Rfl:     ALLERGIES:  Allergies   Allergen Reactions     No Clinical Screening - See Comments Other (See Comments)     LW Other1: -seasonal     Contrast Dye Itching and Unknown     Other Reaction(s): Unknown    05/09/2024: Patient premedicated with the 24 hour prednisone protocol. After contrast administration, she sneezed multiple times, tachycardic, SOB. IR nurse and radiologist were called to the room. Per Dr. Florentino(R radiologist) since she has breakthrough reactions, this  "patient should not have CT contrast dye administered.      Medicated with 50mg IV Benadryl on 2- and pt sneezed three times after injection but said it was much better then last time.  Jessica Pepper 2/27/2015   4:02 PM    Medicated with 50mg IV Benadryl on 2- and pt sneezed three times after injection but said it was much better then last time.     Diatrizoate Itching     Medicated with 50mg IV Benadryl on 2- and pt sneezed three times after injection but said it was much better then last time.  Jessica Pepper ....................  2/27/2015   4:02 PM     Sertraline Palpitations       PHYSICAL EXAM  /80 (BP Location: Right arm, Patient Position: Sitting, Cuff Size: Adult Regular)   Pulse 71   Resp 16   LMP 03/01/2025   SpO2 99%     Bedside BP and HR (no betablocker for 1 week)  Supine   BP  130/81 HR 69  Stand 30 sec  /87 HR 92 Lightheaded  Stand 1 min  /87 HR 96  \"  Stand 3 min  /88 HR 97  \"    NEUROLOGICAL EXAMINATION  Mental status: normal.  Speech: normal.  Muscle strength: Normal muscle strength 5/5 proximally and distally in upper and lower limbs.  Sensation: Mildly reduced cold sensation in the feet. Intact sensation to light touch, pinprick, vibration in upper and lower limbs.  Deep tendon reflexes: Normal reflexes in the upper and lower limbs including intact bilateral ankle reflexes  High arched feet: Absent  Hammertoes: Absent  Coordination: normal rapid alternating movements and finger to nose testing  Gait: normal.  Walk on heels: yes, bilaterally.  Walk on toes: yes, bilaterally.    Getting up from seated position without pushing on chair: yes.  Posture: normal.  Romberg: negative.    ARS 4/14/2025: There is distal postganglionic sympathetic sudomotor impairment with normal cardiovagal and cardiovascular adrenergic function. There is no objective evidence of orthostatic tachycardia but she took metoprolol succinate less than 24 hour prior to the test. "     ASSESSMENT / PLAN   #1 Orthostatic intolerance/POTS (post viral)    Ms. Hendrickson's reported symptoms could fit with orthostatic intolerance. Tilt table and bedside BP and HR showed a tendency to develop symptomatic orthostatic tachycardia/POTS. Contributing factors to her symptoms include post viral/COVID infection, physical deconditioning, and a component of anxiety/hyperventilation. The distal postganglionic sympathetic sudomotor impairment noted on ARS would not explain her symptom and is of uncertain clinical significance. She does not have sensory symptoms or objective evidence of small fiber neuropathy on exam today. We discussed that there is currently no indication for immunotherapy. She has already started several appropriate non-pharmacologic approaches with improvement of her symptoms (less frequent and less severe episodes). I agree with discontinuing metoprolol. After discussion, she would like to try managing her symptoms without medication first. If symptoms continue to be bothersome, we can consider a trial of ivabradine. We discussed several non-pharmacologic approaches to minimize her OI symptoms as listed below.     1) Recognize that orthostatic intolerance will vary depending on a number of factors, including volume status, ambient temperature, and physical activity (physical conditioning).   Therefore, lifestyle modifications include  Avoid exposure to heat (hot shower, hot tub, etc.)  Exercise regularly  Eat small, frequent meals (rather than large meals)  Avoid alcohol    2) Increase daily intake of water and sodium: Every day  Drink 1.5 to 2.5 liters of water or other non-caffeinated liquids (eight 8-ounce glasses)  Drink at least 1 glass of water or fluids with each meal  Drink at least 1 glass at two other times  AND  Increase sodium intake  This must be determined according to the specific circumstances  In the absence of heart failure or kidney disease  Take at least 5 grams of salt (2  grams of sodium) and up to 10-20 grams of salt (4-8 grams of sodium) daily ; table salt is about 40% sodium  1 teaspoon of salt = 5.9 grams of salt = 2.3 grams of sodium   Thus:  take at least 1 tsp and up to 4 tsp of salt daily  Foods high in sodium include salted nuts and pumpkin seeds, olives, pickles, canned soups, ham, coleman, and additives such as soy sauce  Over-the-counter sodium chloride salt tablets (1 gram) may be used but can produce stomach upset.   A good alternative is Thermotabs (available over the counter), which are buffered salt tablets. Each tablet contains 180 mg of sodium and 15 mg of potassium. Two or 3 tablets twice a day (with breakfast and lunch) provide 720 mg to 1080 mg sodium and 60 to 90 mg of potassium daily and are easier on the stomach.  Increase the amount of potassium in your diet with fruit (especially bananas), vegetables, and potatoes     IMPORTANT: In the presence of heart or kidney disease the water and sodium intake have to be adjusted under the supervision of the local provider    3.) Wear compression garments: While up during the day  Put on before getting out of bed and take off when lying down  Wear an abdominal binder or  wear waist-high or thigh-high compression stockings or both  Spandex-type compression shirt and shorts sized to fit snugly may be more comfortable during exercise    4.)  Postural maneuvers: Perform as soon as you become symptomatic  Sybil abdominal, buttock, and thigh muscles for 30 seconds  Crossing legs  Bending forward at the waist  Rising on toes  Slow marching in pace  Squatting     5.) Exercise regularly: Avoid hot, humid environments ([facility] protocol was given to patient)  Cardio  Focus on resistance more than endurance training  Recumbent biking, swimming, rowing  5-6 days per week, 30-60 minutes per day  AND  Weights  Avoid heavy weights or sustained lifts  Leg presses, toe presses, leg extensions, leg curls  2 sets of 12 repetitions per  exercise  2-3 days per week    I spent a total of 75 minutes on the day of the visit for chart review, face-to-face visit, counseling/coordination of care, and documentation. Please see the note for further information on patient assessment and treatment.       PATIENT EDUCATION  Ready to learn, no apparent learning barriers were identified; learning preferences include listening.  Explained diagnosis and treatment plan; patient expressed understanding of the content.      Again, thank you for allowing me to participate in the care of your patient.      Sincerely,    Robert Jauregui MD

## 2025-06-12 ENCOUNTER — HOSPITAL ENCOUNTER (EMERGENCY)
Facility: CLINIC | Age: 45
Discharge: HOME | End: 2025-06-12
Attending: EMERGENCY MEDICINE
Payer: COMMERCIAL

## 2025-06-12 VITALS
RESPIRATION RATE: 18 BRPM | TEMPERATURE: 97.7 F | HEIGHT: 69 IN | DIASTOLIC BLOOD PRESSURE: 83 MMHG | HEART RATE: 79 BPM | OXYGEN SATURATION: 99 % | WEIGHT: 174 LBS | BODY MASS INDEX: 25.77 KG/M2 | SYSTOLIC BLOOD PRESSURE: 158 MMHG

## 2025-06-12 DIAGNOSIS — R07.9 CHEST PAIN, UNSPECIFIED TYPE: ICD-10-CM

## 2025-06-12 DIAGNOSIS — R55 NEAR SYNCOPE: ICD-10-CM

## 2025-06-12 LAB
ANION GAP SERPL CALCULATED.3IONS-SCNC: 10 MMOL/L (ref 7–15)
ATRIAL RATE - MUSE: 73 BPM
BASOPHILS # BLD AUTO: 0 10E3/UL (ref 0–0.2)
BASOPHILS NFR BLD AUTO: 1 %
BUN SERPL-MCNC: 15.6 MG/DL (ref 6–20)
CALCIUM SERPL-MCNC: 9.4 MG/DL (ref 8.8–10.4)
CHLORIDE SERPL-SCNC: 104 MMOL/L (ref 98–107)
CREAT SERPL-MCNC: 0.64 MG/DL (ref 0.51–0.95)
D DIMER PPP FEU-MCNC: 0.31 UG/ML FEU (ref 0–0.5)
DIASTOLIC BLOOD PRESSURE - MUSE: NORMAL MMHG
EGFRCR SERPLBLD CKD-EPI 2021: >90 ML/MIN/1.73M2
EOSINOPHIL # BLD AUTO: 0.1 10E3/UL (ref 0–0.7)
EOSINOPHIL NFR BLD AUTO: 3 %
ERYTHROCYTE [DISTWIDTH] IN BLOOD BY AUTOMATED COUNT: 13 % (ref 10–15)
GLUCOSE SERPL-MCNC: 95 MG/DL (ref 70–99)
HCO3 SERPL-SCNC: 24 MMOL/L (ref 22–29)
HCT VFR BLD AUTO: 38.5 % (ref 35–47)
HGB BLD-MCNC: 13 G/DL (ref 11.7–15.7)
HOLD SPECIMEN: NORMAL
IMM GRANULOCYTES # BLD: 0 10E3/UL
IMM GRANULOCYTES NFR BLD: 0 %
INTERPRETATION ECG - MUSE: NORMAL
LYMPHOCYTES # BLD AUTO: 2 10E3/UL (ref 0.8–5.3)
LYMPHOCYTES NFR BLD AUTO: 42 %
MCH RBC QN AUTO: 28.5 PG (ref 26.5–33)
MCHC RBC AUTO-ENTMCNC: 33.8 G/DL (ref 31.5–36.5)
MCV RBC AUTO: 84 FL (ref 78–100)
MONOCYTES # BLD AUTO: 0.4 10E3/UL (ref 0–1.3)
MONOCYTES NFR BLD AUTO: 9 %
NEUTROPHILS # BLD AUTO: 2.2 10E3/UL (ref 1.6–8.3)
NEUTROPHILS NFR BLD AUTO: 46 %
NRBC # BLD AUTO: 0 10E3/UL
NRBC BLD AUTO-RTO: 0 /100
P AXIS - MUSE: 52 DEGREES
PLATELET # BLD AUTO: 244 10E3/UL (ref 150–450)
POTASSIUM SERPL-SCNC: 3.8 MMOL/L (ref 3.4–5.3)
PR INTERVAL - MUSE: 136 MS
QRS DURATION - MUSE: 98 MS
QT - MUSE: 400 MS
QTC - MUSE: 440 MS
R AXIS - MUSE: 73 DEGREES
RBC # BLD AUTO: 4.56 10E6/UL (ref 3.8–5.2)
SODIUM SERPL-SCNC: 138 MMOL/L (ref 135–145)
SYSTOLIC BLOOD PRESSURE - MUSE: NORMAL MMHG
T AXIS - MUSE: 64 DEGREES
TROPONIN T SERPL HS-MCNC: <6 NG/L
VENTRICULAR RATE- MUSE: 73 BPM
WBC # BLD AUTO: 4.9 10E3/UL (ref 4–11)

## 2025-06-12 PROCEDURE — 85025 COMPLETE CBC W/AUTO DIFF WBC: CPT | Performed by: EMERGENCY MEDICINE

## 2025-06-12 PROCEDURE — 85379 FIBRIN DEGRADATION QUANT: CPT | Performed by: EMERGENCY MEDICINE

## 2025-06-12 PROCEDURE — 258N000003 HC RX IP 258 OP 636: Performed by: EMERGENCY MEDICINE

## 2025-06-12 PROCEDURE — 96360 HYDRATION IV INFUSION INIT: CPT

## 2025-06-12 PROCEDURE — 84484 ASSAY OF TROPONIN QUANT: CPT | Performed by: EMERGENCY MEDICINE

## 2025-06-12 PROCEDURE — 93005 ELECTROCARDIOGRAM TRACING: CPT

## 2025-06-12 PROCEDURE — 99284 EMERGENCY DEPT VISIT MOD MDM: CPT | Mod: 25

## 2025-06-12 PROCEDURE — 36415 COLL VENOUS BLD VENIPUNCTURE: CPT | Performed by: EMERGENCY MEDICINE

## 2025-06-12 PROCEDURE — 80048 BASIC METABOLIC PNL TOTAL CA: CPT | Performed by: EMERGENCY MEDICINE

## 2025-06-12 RX ADMIN — SODIUM CHLORIDE 1000 ML: 0.9 INJECTION, SOLUTION INTRAVENOUS at 12:01

## 2025-06-12 ASSESSMENT — COLUMBIA-SUICIDE SEVERITY RATING SCALE - C-SSRS
2. HAVE YOU ACTUALLY HAD ANY THOUGHTS OF KILLING YOURSELF IN THE PAST MONTH?: NO
6. HAVE YOU EVER DONE ANYTHING, STARTED TO DO ANYTHING, OR PREPARED TO DO ANYTHING TO END YOUR LIFE?: NO
1. IN THE PAST MONTH, HAVE YOU WISHED YOU WERE DEAD OR WISHED YOU COULD GO TO SLEEP AND NOT WAKE UP?: NO

## 2025-06-12 ASSESSMENT — ACTIVITIES OF DAILY LIVING (ADL)
ADLS_ACUITY_SCORE: 41

## 2025-06-12 NOTE — ED TRIAGE NOTES
"Pt arrives via triage from work with c/o SOB and dizziness, onset approximately 0945. Pt is an  and reports she was sitting at her desk during onset. Pt states her pulse was 55 bpm at that time. Pulse in triage is 79 bpm. Pt is nauseous, reports \"tightness\" in her chest, and SOB.       Hx POTS  "

## 2025-06-12 NOTE — Clinical Note
Maria Esther Hendrickson was seen and treated in our emergency department on 6/12/2025.  She may return to work on 06/13/2025.       If you have any questions or concerns, please don't hesitate to call.      Jon Monreal MD

## 2025-06-12 NOTE — ED PROVIDER NOTES
"  Emergency Department Note      History of Present Illness     Chief Complaint   Shortness of Breath and Chest Pain      HPI   Maria Esther Hendrickson is a 45 year old female who presents from work today reports she was sitting there when she started a little short of breath and lightheaded, she has history of POTS and did not feel her heart rate get high like it sometimes has in the past.  She used to take metoprolol but is actually told to come off that.  Symptoms were quite intense and she drove herself here.  She did not eat much this morning to strength little coffee.  Did not have any shortness of breath or symptoms on the way to work.  Denies any recent fevers, denies leg swelling or pain.    Independent Historian   None    Review of External Notes       Past Medical History     Medical History and Problem List   Past Medical History:   Diagnosis Date    POTS (postural orthostatic tachycardia syndrome)        Medications   acetaminophen (TYLENOL) 500 MG tablet  metoprolol succinate ER (TOPROL XL) 25 MG 24 hr tablet        Surgical History   Past Surgical History:   Procedure Laterality Date    APPENDECTOMY  06/2014    AS HYSTEROSCOPY W ENDOMETRIAL BX/POLYPECTOMY W/WO D&C N/A 03/2018       Physical Exam     Patient Vitals for the past 24 hrs:   BP Temp Temp src Pulse Resp SpO2 Height Weight   06/12/25 1034 (!) 158/83 97.7  F (36.5  C) Oral 79 18 99 % 1.753 m (5' 9\") 78.9 kg (174 lb)     Physical Exam  Gen: well appearing, in no acute distress  Oral : Mucous membranes moist,   Nose: No rhinorhea  Ears: External near normal, without drainage  Eyes: periorbital tissues and sclera normal   Neck: supple, no abnormal swelling  Lungs: Clear bilaterally, no tachypnea or distress, speaks full sentences  CV: Regular rate, regular rhythm  Abd: soft, nontender, nondistended, no rebound/guarding  Ext: no lower extremity edema  Skin: warm, dry, well perfused, no rashes/bruising/lesions on exposed skin  Neuro: alert, no gross motor " or sensory deficits,   Psych: pleasant mood, normal affect      Diagnostics     Lab Results   Labs Ordered and Resulted from Time of ED Arrival to Time of ED Departure   BASIC METABOLIC PANEL - Normal       Result Value    Sodium 138      Potassium 3.8      Chloride 104      Carbon Dioxide (CO2) 24      Anion Gap 10      Urea Nitrogen 15.6      Creatinine 0.64      GFR Estimate >90      Calcium 9.4      Glucose 95     TROPONIN T, HIGH SENSITIVITY - Normal    Troponin T, High Sensitivity <6     D DIMER QUANTITATIVE - Normal    D-Dimer Quantitative 0.31     CBC WITH PLATELETS AND DIFFERENTIAL    WBC Count 4.9      RBC Count 4.56      Hemoglobin 13.0      Hematocrit 38.5      MCV 84      MCH 28.5      MCHC 33.8      RDW 13.0      Platelet Count 244      % Neutrophils 46      % Lymphocytes 42      % Monocytes 9      % Eosinophils 3      % Basophils 1      % Immature Granulocytes 0      NRBCs per 100 WBC 0      Absolute Neutrophils 2.2      Absolute Lymphocytes 2.0      Absolute Monocytes 0.4      Absolute Eosinophils 0.1      Absolute Basophils 0.0      Absolute Immature Granulocytes 0.0      Absolute NRBCs 0.0         Imaging   No orders to display       EKG   ECG results from 06/12/25   EKG 12-lead, tracing only     Value    Systolic Blood Pressure     Diastolic Blood Pressure     Ventricular Rate 73    Atrial Rate 73    PA Interval 136    QRS Duration 98        QTc 440    P Axis 52    R AXIS 73    T Axis 64    Interpretation ECG      Sinus rhythm  Normal ECG  No previous ECGs available  Confirmed by GENERATED REPORT, COMPUTER ([id number]),  Hansa Knight ([id number]) on 6/12/2025 10:44:37 AM           Independent Interpretation   None    ED Course      Medications Administered   Medications   sodium chloride 0.9% BOLUS 1,000 mL (0 mLs Intravenous Stopped 6/12/25 1313)       Procedures   Procedures     Discussion of Management   None    ED Course        Additional Documentation  None    Medical Decision Making /  Diagnosis     CMS Diagnoses: None    MIPS   None               MDM   Maria Esther Hendrickson is a 45 year old female presents after near syncopal event where she got dizzy and lightheaded felt like her blood pressure was normal but her heart rate was a little bit lower than normal.  She has history of POTS sometimes she would get dizzy and lightheaded with a high heart rate.  She used to be on metoprolol but had that discontinued due to concern that her heart rate was getting too low at times.  She has not had any that in a couple of weeks.  EKG shows no obvious ischemic changes, she is given a saline bolus and had a troponin that was undetectable.  D-dimer on low as well without obvious symptoms of pulmonary embolism or DVT.  Do not feel she needs any additional workup for that.  Patient felt improved after saline bolus, no evidence of heart arrhythmia present during her time in the ED.  Recently she has had a cardiac echo and had a chest x-ray about 1 month ago I do not feel that those need to be repeated today.  Patient feels improved she feels comfortable following up with her outpatient provider which I think is very reasonable.  Have low suspicion for ACS, PE dissection or life-threatening arrhythmia at this time.    Disposition   The patient was discharged.     Diagnosis     ICD-10-CM    1. Near syncope  R55       2. Chest pain, unspecified type  R07.9            Discharge Medications   New Prescriptions    No medications on file         MD Jocelin Soto Paul Anthony, MD  06/12/25 [id number]

## 2025-08-08 ENCOUNTER — MYC MEDICAL ADVICE (OUTPATIENT)
Dept: FAMILY MEDICINE | Facility: CLINIC | Age: 45
End: 2025-08-08
Payer: COMMERCIAL